# Patient Record
Sex: MALE | NOT HISPANIC OR LATINO | Employment: UNEMPLOYED | URBAN - METROPOLITAN AREA
[De-identification: names, ages, dates, MRNs, and addresses within clinical notes are randomized per-mention and may not be internally consistent; named-entity substitution may affect disease eponyms.]

---
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
## 2023-01-26 ENCOUNTER — APPOINTMENT (OUTPATIENT)
Dept: URBAN - METROPOLITAN AREA CLINIC 256 | Age: 23
Setting detail: DERMATOLOGY
End: 2023-01-29

## 2023-01-26 VITALS — HEIGHT: 72 IN | WEIGHT: 230 LBS

## 2023-01-26 DIAGNOSIS — L63.8 OTHER ALOPECIA AREATA: ICD-10-CM

## 2023-01-26 PROCEDURE — 99204 OFFICE O/P NEW MOD 45 MIN: CPT | Mod: 25

## 2023-01-26 PROCEDURE — OTHER MIPS QUALITY: OTHER

## 2023-01-26 PROCEDURE — OTHER PRESCRIPTION MEDICATION MANAGEMENT: OTHER

## 2023-01-26 PROCEDURE — OTHER COUNSELING: OTHER

## 2023-01-26 PROCEDURE — OTHER INTRALESIONAL KENALOG: OTHER

## 2023-01-26 PROCEDURE — 11900 INJECT SKIN LESIONS </W 7: CPT

## 2023-01-26 PROCEDURE — OTHER PRESCRIPTION: OTHER

## 2023-01-26 RX ORDER — BETAMETHASONE VALERATE 1 MG/G
0.1% CREAM TOPICAL
Qty: 45 | Refills: 1 | Status: ERX | COMMUNITY
Start: 2023-01-26

## 2023-01-26 ASSESSMENT — LOCATION DETAILED DESCRIPTION DERM
LOCATION DETAILED: RIGHT INFERIOR CENTRAL MALAR CHEEK
LOCATION DETAILED: RIGHT INFERIOR LATERAL MALAR CHEEK
LOCATION DETAILED: LEFT INFERIOR CENTRAL MALAR CHEEK
LOCATION DETAILED: LEFT INFERIOR LATERAL MALAR CHEEK

## 2023-01-26 ASSESSMENT — LOCATION SIMPLE DESCRIPTION DERM
LOCATION SIMPLE: LEFT CHEEK
LOCATION SIMPLE: RIGHT CHEEK

## 2023-01-26 ASSESSMENT — SEVERITY OF ALOPECIA TOOL: % SCALP HAIR LOST: 2

## 2023-01-26 ASSESSMENT — LOCATION ZONE DERM: LOCATION ZONE: FACE

## 2023-01-26 NOTE — PROCEDURE: COUNSELING
Detail Level: Detailed
Patient Specific Counseling (Will Not Stick From Patient To Patient): Discussed options including Rogaine, topical steroids, kenalog injections and Olumiant. Recommend continued use of betamethasone valerate, kenalog injections and begin applying Rogaine with Q tip.

## 2023-01-26 NOTE — PROCEDURE: INTRALESIONAL KENALOG
Medical Necessity Clause: This procedure was medically necessary because the lesions that were treated were:\\n\\nLeft superior parietal scalp\\nLesions Injected: 1\\nMedication Injected: 5.0 mg/cc of Kenalog\\nTotal Volume Injected: .02cc\\nLot Number: [id number]\\nExpiration Date: 02/2024\\nNDC #: 7114-2131-20\\nAdministered by: Sunni Hunt Medical Necessity Clause: This procedure was medically necessary because the lesions that were treated were:\\n\\nLeft superior parietal scalp\\nLesions Injected: 1\\nMedication Injected: 5.0 mg/cc of Kenalog\\nTotal Volume Injected: .02cc\\nLot Number: [id number]\\nExpiration Date: 02/2024\\nNDC #: 6343-2703-12\\nAdministered by: Sunni Hunt

## 2023-01-26 NOTE — PROCEDURE: PRESCRIPTION MEDICATION MANAGEMENT
Modify Regimen: Alternate betamethasone valerate every other night with Minoxidil.
Render In Strict Bullet Format?: No
Detail Level: Zone

## 2023-01-26 NOTE — HPI: HAIR LOSS
Previous Labs: No
How Did The Hair Loss Occur?: gradual in onset
How Severe Is Your Hair Loss?: moderate
Additional History: Patient has been using betamethasone valerate cream prescribed by a dermatologist in [facility]. He received steroid injections at his sites of hair loss about 4 weeks ago while in [place].

## 2023-07-05 ENCOUNTER — APPOINTMENT (OUTPATIENT)
Dept: URBAN - METROPOLITAN AREA CLINIC 256 | Age: 23
Setting detail: DERMATOLOGY
End: 2023-07-05

## 2023-07-05 VITALS — WEIGHT: 240 LBS | HEIGHT: 72 IN

## 2023-07-05 DIAGNOSIS — L63.8 OTHER ALOPECIA AREATA: ICD-10-CM

## 2023-07-05 PROCEDURE — 99213 OFFICE O/P EST LOW 20 MIN: CPT | Mod: 25

## 2023-07-05 PROCEDURE — 11900 INJECT SKIN LESIONS </W 7: CPT

## 2023-07-05 PROCEDURE — OTHER INTRALESIONAL KENALOG: OTHER

## 2023-07-05 PROCEDURE — OTHER MIPS QUALITY: OTHER

## 2023-07-05 PROCEDURE — OTHER COUNSELING: OTHER

## 2023-07-05 PROCEDURE — OTHER PHOTO-DOCUMENTATION: OTHER

## 2023-07-05 PROCEDURE — OTHER PATIENT SPECIFIC COUNSELING: OTHER

## 2023-07-05 ASSESSMENT — LOCATION SIMPLE DESCRIPTION DERM
LOCATION SIMPLE: LEFT CHEEK
LOCATION SIMPLE: RIGHT CHEEK

## 2023-07-05 ASSESSMENT — LOCATION ZONE DERM: LOCATION ZONE: FACE

## 2023-07-05 ASSESSMENT — LOCATION DETAILED DESCRIPTION DERM
LOCATION DETAILED: LEFT INFERIOR CENTRAL MALAR CHEEK
LOCATION DETAILED: LEFT SUPERIOR LATERAL BUCCAL CHEEK
LOCATION DETAILED: RIGHT SUPERIOR LATERAL BUCCAL CHEEK

## 2023-07-05 NOTE — PROCEDURE: INTRALESIONAL KENALOG
Ndc# For Kenalog Only: 1740-7568-59 Ndc# For Kenalog Only: 6369-7029-49 Spine appears normal, movement of extremities grossly intact.

## 2023-07-05 NOTE — PROCEDURE: PATIENT SPECIFIC COUNSELING
- advised patient to discontinue use of Betamethasone 0.1% topical cream since we injected the areas today
Detail Level: Zone

## 2023-08-25 ENCOUNTER — APPOINTMENT (OUTPATIENT)
Dept: URBAN - METROPOLITAN AREA CLINIC 256 | Age: 23
Setting detail: DERMATOLOGY
End: 2023-08-26

## 2023-08-25 DIAGNOSIS — L63.8 OTHER ALOPECIA AREATA: ICD-10-CM

## 2023-08-25 PROCEDURE — 11901 INJECT SKIN LESIONS >7: CPT

## 2023-08-25 PROCEDURE — OTHER INTRALESIONAL KENALOG: OTHER

## 2023-08-25 PROCEDURE — OTHER COUNSELING: OTHER

## 2023-08-25 PROCEDURE — OTHER PHOTO-DOCUMENTATION: OTHER

## 2023-08-25 PROCEDURE — OTHER SEPARATE AND IDENTIFIABLE DOCUMENTATION: OTHER

## 2023-08-25 PROCEDURE — 99212 OFFICE O/P EST SF 10 MIN: CPT | Mod: 25

## 2023-08-25 PROCEDURE — OTHER MIPS QUALITY: OTHER

## 2023-08-25 ASSESSMENT — LOCATION DETAILED DESCRIPTION DERM
LOCATION DETAILED: LEFT SUPERIOR PREAURICULAR CHEEK
LOCATION DETAILED: LEFT SUPERIOR LATERAL MALAR CHEEK
LOCATION DETAILED: RIGHT SUPERIOR LATERAL MALAR CHEEK
LOCATION DETAILED: LEFT SUPERIOR LATERAL BUCCAL CHEEK
LOCATION DETAILED: LEFT INFERIOR LATERAL MALAR CHEEK
LOCATION DETAILED: LEFT LATERAL MALAR CHEEK
LOCATION DETAILED: RIGHT SUPERIOR LATERAL BUCCAL CHEEK
LOCATION DETAILED: RIGHT LATERAL MALAR CHEEK

## 2023-08-25 ASSESSMENT — LOCATION SIMPLE DESCRIPTION DERM
LOCATION SIMPLE: RIGHT CHEEK
LOCATION SIMPLE: LEFT CHEEK

## 2023-08-25 ASSESSMENT — LOCATION ZONE DERM: LOCATION ZONE: FACE

## 2023-09-28 ENCOUNTER — APPOINTMENT (OUTPATIENT)
Dept: URBAN - METROPOLITAN AREA CLINIC 256 | Age: 23
Setting detail: DERMATOLOGY
End: 2023-09-28

## 2023-09-28 VITALS — HEIGHT: 72 IN | WEIGHT: 237 LBS

## 2023-09-28 DIAGNOSIS — L63.8 OTHER ALOPECIA AREATA: ICD-10-CM

## 2023-09-28 PROCEDURE — OTHER SEPARATE AND IDENTIFIABLE DOCUMENTATION: OTHER

## 2023-09-28 PROCEDURE — OTHER COUNSELING: OTHER

## 2023-09-28 PROCEDURE — OTHER MIPS QUALITY: OTHER

## 2023-09-28 PROCEDURE — 99212 OFFICE O/P EST SF 10 MIN: CPT | Mod: 25

## 2023-09-28 PROCEDURE — OTHER PHOTO-DOCUMENTATION: OTHER

## 2023-09-28 PROCEDURE — OTHER INTRALESIONAL KENALOG: OTHER

## 2023-09-28 PROCEDURE — 11900 INJECT SKIN LESIONS </W 7: CPT

## 2023-09-28 ASSESSMENT — LOCATION DETAILED DESCRIPTION DERM
LOCATION DETAILED: RIGHT INFERIOR LATERAL MALAR CHEEK
LOCATION DETAILED: RIGHT CENTRAL BUCCAL CHEEK

## 2023-09-28 ASSESSMENT — SEVERITY ASSESSMENT OVERALL AMONG ALL PATIENTS
IN YOUR EXPERIENCE, AMONG ALL PATIENTS YOU HAVE SEEN WITH THIS CONDITION, HOW SEVERE IS THIS PATIENT'S CONDITION?: S2 (25-49% HAIR LOSS)

## 2023-09-28 ASSESSMENT — LOCATION ZONE DERM: LOCATION ZONE: FACE

## 2023-09-28 ASSESSMENT — SEVERITY OF ALOPECIA TOOL: % SCALP HAIR LOST: 30

## 2023-09-28 ASSESSMENT — LOCATION SIMPLE DESCRIPTION DERM: LOCATION SIMPLE: RIGHT CHEEK

## 2023-12-15 ENCOUNTER — VIRTUAL VISIT (OUTPATIENT)
Dept: FAMILY MEDICINE | Facility: CLINIC | Age: 23
End: 2023-12-15
Payer: COMMERCIAL

## 2023-12-15 DIAGNOSIS — F33.1 MODERATE EPISODE OF RECURRENT MAJOR DEPRESSIVE DISORDER (H): Primary | ICD-10-CM

## 2023-12-15 PROCEDURE — 99203 OFFICE O/P NEW LOW 30 MIN: CPT | Mod: VID | Performed by: NURSE PRACTITIONER

## 2023-12-15 RX ORDER — BUPROPION HYDROCHLORIDE 300 MG/1
300 TABLET ORAL EVERY MORNING
Qty: 90 TABLET | Refills: 3 | Status: SHIPPED | OUTPATIENT
Start: 2023-12-15 | End: 2024-06-17

## 2023-12-15 ASSESSMENT — PATIENT HEALTH QUESTIONNAIRE - PHQ9: SUM OF ALL RESPONSES TO PHQ QUESTIONS 1-9: 15

## 2023-12-15 NOTE — PROGRESS NOTES
Michael is a 23 year old who is being evaluated via a billable video visit.      How would you like to obtain your AVS? MyChart  If the video visit is dropped, the invitation should be resent by: Text to cell phone: [phone]  Will anyone else be joining your video visit? No          Assessment & Plan     Moderate episode of recurrent major depressive disorder (H)  Patient did not want to make any changes to treatment, he just wanted to discuss options and then he will follow up fi after he spends time on his vacation if he is still having worsening symptoms    - buPROPion (WELLBUTRIN XL) 300 MG 24 hr tablet  Dispense: 90 tablet; Refill: 3    Kinjal Tovar NP  [facility]   Michael is a 23 year old, presenting for the following health issues:  Recheck Medication (Depression medications)      12/15/2023     2:12 PM   Additional Questions   Roomed by DAWN Agustin   Accompanied by Self       History of Present Illness       Reason for visit:  Medication    He eats 0-1 servings of fruits and vegetables daily.He consumes 0 sweetened beverage(s) daily.He exercises with enough effort to increase his heart rate 30 to 60 minutes per day.  He exercises with enough effort to increase his heart rate 5 days per week.   He is taking medications regularly.       He would like to discuss depression, he started taking from [employer] online provider website, he was taking wellbutrin and he was taking this in 2021 for about 6 months, lately he is feeling down, he started taking the medication again about 2 weeks ago, he started with 300 mg of wellbutrin, he knows that it can take up to 2 months for the medication, he is traveling in about 1.5 weeks and [place] is going [place] and will be there for a month and a half.    PATIENT HEALTH QUESTIONNAIRE-9 (PHQ - 9)    Over the last 2 weeks, how often have you been bothered by any of the following problems?    1. Little interest or pleasure in doing  things -  More than half the days   2. Feeling down, depressed, or hopeless -  More than half the days   3. Trouble falling or staying asleep, or sleeping too much - Nearly every day   4. Feeling tired or having little energy -  Nearly every day   5. Poor appetite or overeating -  More than half the days   6. Feeling bad about yourself - or that you are a failure or have let yourself or your family down -  More than half the days   7. Trouble concentrating on things, such as reading the newspaper or watching television - Several days   8. Moving or speaking so slowly that other people could have noticed? Or the opposite - being so fidgety or restless that you have been moving around a lot more than usual Not at all   9. Thoughts that you would be better off dead or of hurting  yourself in some way Not at all   Total Score: 15     If you checked off any problems, how difficult have these problems made it for you to do your work, take care of things at home, or get along with other people? Somewhat difficult    Developed by Ebony Murcia, Melonie Still, José Miguel Trujillo and colleagues, with an educational cal from [employer] No permission required to reproduce, translate, display or distribute. permission required to reproduce, translate, display or distribute.      Review of Systems   Constitutional, HEENT, cardiovascular, pulmonary, gi and gu systems are negative, except as otherwise noted.      Objective    Vitals - Patient Reported  Weight (Patient Reported): 106.6 kg (235 lb)  Height (Patient Reported): 182.9 cm (6')  BMI (Based on Pt Reported Ht/Wt): 31.87  Pain Score: No Pain (0)        Physical Exam   GENERAL: Healthy, alert and no distress  EYES: Eyes grossly normal to inspection.  No discharge or erythema, or obvious scleral/conjunctival abnormalities.  RESP: No audible wheeze, cough, or visible cyanosis.  No visible retractions or increased work of breathing.    SKIN: Visible skin clear. No  significant rash, abnormal pigmentation or lesions.  NEURO: Cranial nerves grossly intact.  Mentation and speech appropriate for age.  PSYCH: Mentation appears normal, affect normal/bright, judgement and insight intact, normal speech and appearance well-groomed.                Video-Visit Details    Type of service:  Video Visit     Originating Location (pt. Location): Home    Distant Location (provider location):  On-site  Platform used for Video Visit: [employer]      Answers submitted by the patient for this visit:  General Questionnaire (Submitted on 12/15/2023)  Chief Complaint: Chronic problems general questions HPI Form  What is the reason for your visit today? : Medication  How many servings of fruits and vegetables do you eat daily?: 0-1  On average, how many sweetened beverages do you drink each day (Examples: soda, juice, sweet tea, etc.  Do NOT count diet or artificially sweetened beverages)?: 0  How many minutes a day do you exercise enough to make your heart beat faster?: 30 to 60  How many days a week do you exercise enough to make your heart beat faster?: 5  How many days per week do you miss taking your medication?: 0

## 2024-03-26 ENCOUNTER — APPOINTMENT (OUTPATIENT)
Dept: URBAN - METROPOLITAN AREA CLINIC 256 | Age: 24
Setting detail: DERMATOLOGY
End: 2024-03-26

## 2024-03-26 DIAGNOSIS — L63.8 OTHER ALOPECIA AREATA: ICD-10-CM

## 2024-03-26 PROCEDURE — OTHER PATIENT SPECIFIC COUNSELING: OTHER

## 2024-03-26 PROCEDURE — 99212 OFFICE O/P EST SF 10 MIN: CPT | Mod: 25

## 2024-03-26 PROCEDURE — OTHER MIPS QUALITY: OTHER

## 2024-03-26 PROCEDURE — OTHER INTRALESIONAL KENALOG: OTHER

## 2024-03-26 PROCEDURE — OTHER SEPARATE AND IDENTIFIABLE DOCUMENTATION: OTHER

## 2024-03-26 PROCEDURE — OTHER COUNSELING: OTHER

## 2024-03-26 PROCEDURE — 11900 INJECT SKIN LESIONS </W 7: CPT

## 2024-03-26 ASSESSMENT — LOCATION SIMPLE DESCRIPTION DERM
LOCATION SIMPLE: RIGHT TEMPLE
LOCATION SIMPLE: LEFT TEMPLE
LOCATION SIMPLE: RIGHT CHEEK
LOCATION SIMPLE: LEFT CHEEK
LOCATION SIMPLE: SCALP

## 2024-03-26 ASSESSMENT — LOCATION DETAILED DESCRIPTION DERM
LOCATION DETAILED: RIGHT INFERIOR FRONTAL SCALP
LOCATION DETAILED: RIGHT LATERAL TEMPLE
LOCATION DETAILED: RIGHT SUPERIOR LATERAL BUCCAL CHEEK
LOCATION DETAILED: LEFT LATERAL TEMPLE
LOCATION DETAILED: LEFT INFERIOR LATERAL MALAR CHEEK

## 2024-03-26 ASSESSMENT — LOCATION ZONE DERM
LOCATION ZONE: SCALP
LOCATION ZONE: FACE

## 2024-03-26 NOTE — PROCEDURE: PATIENT SPECIFIC COUNSELING
-He reports his insurance will no longer cover visits at [place].\\n-Discussed MATTHEW can be filled out at new dermatology clinic where records could be shared. He reports understanding.
Detail Level: Zone

## 2024-03-26 NOTE — PROCEDURE: INTRALESIONAL KENALOG
How Many Mls Were Removed From The 10 Mg/Ml (5ml) Vial When Preparing The Injectable Solution?: 0
Kenalog Preparation: Kenalog
Treatment Number (Optional): 4
Expiration Date For Kenalog (Optional): APR 2024
Include Z78.9 (Other Specified Conditions Influencing Health Status) As An Associated Diagnosis?: No
Concentration Of Kenalog Solution Injected (Mg/Ml): 10.0
Consent: The risks of atrophy were reviewed with the patient.
Detail Level: Detailed
Ndc# For Kenalog Only: 8521-5194-59
Show Inventory Tab: Hide
Total Volume (Ccs): 0.5
Kenalog Type Of Vial: Multiple Dose
Validate Note Data When Using Inventory: Yes
Medical Necessity Clause: This procedure was medically necessary because the lesions that were treated were:
Lot # For Kenalog (Optional): [id number]

## 2024-04-30 ENCOUNTER — APPOINTMENT (OUTPATIENT)
Dept: URBAN - METROPOLITAN AREA CLINIC 256 | Age: 24
Setting detail: DERMATOLOGY
End: 2024-04-30

## 2024-04-30 VITALS — WEIGHT: 230 LBS | HEIGHT: 72 IN

## 2024-04-30 DIAGNOSIS — L63.8 OTHER ALOPECIA AREATA: ICD-10-CM

## 2024-04-30 PROCEDURE — OTHER COUNSELING: OTHER

## 2024-04-30 PROCEDURE — OTHER PATIENT SPECIFIC COUNSELING: OTHER

## 2024-04-30 PROCEDURE — OTHER INTRALESIONAL KENALOG: OTHER

## 2024-04-30 PROCEDURE — OTHER MIPS QUALITY: OTHER

## 2024-04-30 PROCEDURE — 11900 INJECT SKIN LESIONS </W 7: CPT

## 2024-04-30 PROCEDURE — OTHER PHOTO-DOCUMENTATION: OTHER

## 2024-04-30 ASSESSMENT — LOCATION ZONE DERM
LOCATION ZONE: FACE
LOCATION ZONE: SCALP

## 2024-04-30 ASSESSMENT — LOCATION SIMPLE DESCRIPTION DERM
LOCATION SIMPLE: LEFT CHEEK
LOCATION SIMPLE: RIGHT CHEEK
LOCATION SIMPLE: SCALP

## 2024-04-30 ASSESSMENT — LOCATION DETAILED DESCRIPTION DERM
LOCATION DETAILED: LEFT CENTRAL BUCCAL CHEEK
LOCATION DETAILED: RIGHT CENTRAL BUCCAL CHEEK
LOCATION DETAILED: RIGHT INFERIOR CENTRAL MALAR CHEEK
LOCATION DETAILED: RIGHT CENTRAL FRONTAL SCALP
LOCATION DETAILED: RIGHT SUPERIOR CENTRAL BUCCAL CHEEK

## 2024-04-30 ASSESSMENT — SEVERITY OF ALOPECIA TOOL: % SCALP HAIR LOST: 5

## 2024-04-30 NOTE — PROCEDURE: PATIENT SPECIFIC COUNSELING
-Reassured the patient of improvement \\n-Recommended another round of ILK to affected areas. Risks and benefits reviewed and all questions answered\\n-The patient was agreeable.\\n-Informed the patient that he could RTC in 4-5 weeks for repeat ILK if symptoms do not improve. He notes changing insurance and having his next follow up with a different dermatology group.
Detail Level: Zone

## 2024-04-30 NOTE — PROCEDURE: INTRALESIONAL KENALOG
How Many Mls Were Removed From The 10 Mg/Ml (5ml) Vial When Preparing The Injectable Solution?: 0
Administered By (Optional): Mariana White PA-C
Kenalog Preparation: Kenalog
Treatment Number (Optional): 5
Expiration Date For Kenalog (Optional): APR 2024
Include Z78.9 (Other Specified Conditions Influencing Health Status) As An Associated Diagnosis?: No
Concentration Of Kenalog Solution Injected (Mg/Ml): 10.0
Consent: The risks of atrophy were reviewed with the patient.
Detail Level: Detailed
Ndc# For Kenalog Only: 2836-1085-79
Show Inventory Tab: Hide
Total Volume (Ccs): 0.7
Kenalog Type Of Vial: Multiple Dose
Validate Note Data When Using Inventory: Yes
Medical Necessity Clause: This procedure was medically necessary because the lesions that were treated were:
Lot # For Kenalog (Optional): [id number]

## 2024-06-17 ENCOUNTER — OFFICE VISIT (OUTPATIENT)
Dept: INTERNAL MEDICINE | Facility: CLINIC | Age: 24
End: 2024-06-17
Payer: COMMERCIAL

## 2024-06-17 VITALS
SYSTOLIC BLOOD PRESSURE: 120 MMHG | RESPIRATION RATE: 16 BRPM | OXYGEN SATURATION: 97 % | HEART RATE: 74 BPM | DIASTOLIC BLOOD PRESSURE: 70 MMHG | TEMPERATURE: 97.1 F | WEIGHT: 236 LBS

## 2024-06-17 DIAGNOSIS — F33.1 MODERATE EPISODE OF RECURRENT MAJOR DEPRESSIVE DISORDER (H): ICD-10-CM

## 2024-06-17 DIAGNOSIS — Z13.1 SCREENING FOR DIABETES MELLITUS: ICD-10-CM

## 2024-06-17 DIAGNOSIS — Z13.6 CARDIOVASCULAR SCREENING; LDL GOAL LESS THAN 130: ICD-10-CM

## 2024-06-17 DIAGNOSIS — L63.8 OTHER ALOPECIA AREATA: Primary | ICD-10-CM

## 2024-06-17 LAB
ANION GAP SERPL CALCULATED.3IONS-SCNC: 11 MMOL/L (ref 7–15)
BUN SERPL-MCNC: 16.8 MG/DL (ref 6–20)
CALCIUM SERPL-MCNC: 9.8 MG/DL (ref 8.6–10)
CHLORIDE SERPL-SCNC: 104 MMOL/L (ref 98–107)
CHOLEST SERPL-MCNC: 215 MG/DL
CREAT SERPL-MCNC: 0.84 MG/DL (ref 0.67–1.17)
DEPRECATED HCO3 PLAS-SCNC: 23 MMOL/L (ref 22–29)
EGFRCR SERPLBLD CKD-EPI 2021: >90 ML/MIN/1.73M2
ERYTHROCYTE [DISTWIDTH] IN BLOOD BY AUTOMATED COUNT: 12.3 % (ref 10–15)
FASTING STATUS PATIENT QL REPORTED: YES
FASTING STATUS PATIENT QL REPORTED: YES
FERRITIN SERPL-MCNC: 240 NG/ML (ref 31–409)
GLUCOSE SERPL-MCNC: 97 MG/DL (ref 70–99)
HCT VFR BLD AUTO: 47 % (ref 40–53)
HDLC SERPL-MCNC: 52 MG/DL
HGB BLD-MCNC: 16.3 G/DL (ref 13.3–17.7)
IRON BINDING CAPACITY (ROCHE): 325 UG/DL (ref 240–430)
IRON SATN MFR SERPL: 38 % (ref 15–46)
IRON SERPL-MCNC: 124 UG/DL (ref 61–157)
LDLC SERPL CALC-MCNC: 147 MG/DL
MCH RBC QN AUTO: 28.7 PG (ref 26.5–33)
MCHC RBC AUTO-ENTMCNC: 34.7 G/DL (ref 31.5–36.5)
MCV RBC AUTO: 83 FL (ref 78–100)
NONHDLC SERPL-MCNC: 163 MG/DL
PLATELET # BLD AUTO: 301 10E3/UL (ref 150–450)
POTASSIUM SERPL-SCNC: 4.1 MMOL/L (ref 3.4–5.3)
RBC # BLD AUTO: 5.67 10E6/UL (ref 4.4–5.9)
SODIUM SERPL-SCNC: 138 MMOL/L (ref 135–145)
TRIGL SERPL-MCNC: 80 MG/DL
TSH SERPL DL<=0.005 MIU/L-ACNC: 1.22 UIU/ML (ref 0.3–4.2)
WBC # BLD AUTO: 7.4 10E3/UL (ref 4–11)

## 2024-06-17 PROCEDURE — 80048 BASIC METABOLIC PNL TOTAL CA: CPT | Performed by: INTERNAL MEDICINE

## 2024-06-17 PROCEDURE — 82728 ASSAY OF FERRITIN: CPT | Performed by: INTERNAL MEDICINE

## 2024-06-17 PROCEDURE — 83540 ASSAY OF IRON: CPT | Performed by: INTERNAL MEDICINE

## 2024-06-17 PROCEDURE — 36415 COLL VENOUS BLD VENIPUNCTURE: CPT | Performed by: INTERNAL MEDICINE

## 2024-06-17 PROCEDURE — 99203 OFFICE O/P NEW LOW 30 MIN: CPT | Performed by: INTERNAL MEDICINE

## 2024-06-17 PROCEDURE — 80061 LIPID PANEL: CPT | Performed by: INTERNAL MEDICINE

## 2024-06-17 PROCEDURE — 83550 IRON BINDING TEST: CPT | Performed by: INTERNAL MEDICINE

## 2024-06-17 PROCEDURE — 84443 ASSAY THYROID STIM HORMONE: CPT | Performed by: INTERNAL MEDICINE

## 2024-06-17 PROCEDURE — 85027 COMPLETE CBC AUTOMATED: CPT | Performed by: INTERNAL MEDICINE

## 2024-06-17 ASSESSMENT — PATIENT HEALTH QUESTIONNAIRE - PHQ9
10. IF YOU CHECKED OFF ANY PROBLEMS, HOW DIFFICULT HAVE THESE PROBLEMS MADE IT FOR YOU TO DO YOUR WORK, TAKE CARE OF THINGS AT HOME, OR GET ALONG WITH OTHER PEOPLE: SOMEWHAT DIFFICULT
SUM OF ALL RESPONSES TO PHQ QUESTIONS 1-9: 4
SUM OF ALL RESPONSES TO PHQ QUESTIONS 1-9: 4

## 2024-06-17 NOTE — PROGRESS NOTES
Assessment & Plan     Other alopecia areata  Will check labs as ordered, counseled that we should prepare for the fact that all of these results could very well be normal.  Still worth checking.  - TSH with free T4 reflex; Future  - CBC with platelets; Future  - Ferritin; Future  - Iron and iron binding capacity; Future    CARDIOVASCULAR SCREENING; LDL GOAL LESS THAN 130    - Lipid panel reflex to direct LDL Fasting; Future    Screening for diabetes mellitus    - Basic metabolic panel  (Ca, Cl, CO2, Creat, Gluc, K, Na, BUN); Future    Moderate episode of recurrent major depressive disorder (H)  Not currently treated, previously on Wellbutrin.  Clinical course is stable.                Vonnie Rodriguez is a 23 year old, presenting for the following health issues:  Thyroid Problem    History of Present Illness       Hypothyroidism:     Since last visit, patient describes the following symptoms::  Hair loss    He eats 0-1 servings of fruits and vegetables daily.He consumes 0 sweetened beverage(s) daily.He exercises with enough effort to increase his heart rate 60 or more minutes per day.  He exercises with enough effort to increase his heart rate 4 days per week.   He is taking medications regularly.       Patient is new to our clinic today.  He has [facility] for treatment of what has been called alopecia areata.  He presented with a fairly symmetric bald spot/ring on his right face/beard.  Has been treated with intradermal steroid injections and topical steroids.  His dermatologist has recommended that he have thyroid and iron levels checked.          Review of Systems  Constitutional, HEENT, cardiovascular, pulmonary, gi and gu systems are negative, except as otherwise noted.      Objective    /70 (BP Location: Left arm, Patient Position: Sitting, Cuff Size: Adult Regular)   Pulse 74   Temp 97.1  F (36.2  C) (Temporal)   Resp 16   Wt 107 kg (236 lb)    SpO2 97%   There is no height or weight on file to calculate BMI.  Physical Exam   GENERAL: alert and no distress  NECK: no adenopathy, no asymmetry, masses, or scars  RESP: lungs clear to auscultation - no rales, rhonchi or wheezes  CV: regular rate and rhythm, normal S1 S2, no S3 or S4, no murmur, click or rub, no peripheral edema  ABDOMEN: soft, nontender, no hepatosplenomegaly, no masses and bowel sounds normal  MS: no gross musculoskeletal defects noted, no edema            Signed Electronically by: Rocco Pelaez MD

## 2024-07-07 ENCOUNTER — HEALTH MAINTENANCE LETTER (OUTPATIENT)
Age: 24
End: 2024-07-07

## 2024-12-13 ENCOUNTER — APPOINTMENT (OUTPATIENT)
Dept: GENERAL RADIOLOGY | Facility: CLINIC | Age: 24
DRG: 282 | End: 2024-12-13
Attending: PHYSICIAN ASSISTANT

## 2024-12-13 ENCOUNTER — APPOINTMENT (OUTPATIENT)
Dept: CARDIOLOGY | Facility: CLINIC | Age: 24
DRG: 282 | End: 2024-12-13
Attending: PHYSICIAN ASSISTANT

## 2024-12-13 ENCOUNTER — HOSPITAL ENCOUNTER (INPATIENT)
Facility: CLINIC | Age: 24
LOS: 2 days | Discharge: HOME | DRG: 282 | End: 2024-12-15
Attending: PHYSICIAN ASSISTANT | Admitting: HOSPITALIST

## 2024-12-13 DIAGNOSIS — R07.9 CHEST PAIN: ICD-10-CM

## 2024-12-13 DIAGNOSIS — I40.1 ACUTE IDIOPATHIC MYOCARDITIS: Primary | ICD-10-CM

## 2024-12-13 DIAGNOSIS — I51.4 MYOCARDITIS (H): ICD-10-CM

## 2024-12-13 DIAGNOSIS — R79.89 ELEVATED TROPONIN: ICD-10-CM

## 2024-12-13 DIAGNOSIS — R07.2 PRECORDIAL CHEST PAIN: ICD-10-CM

## 2024-12-13 LAB
ANION GAP SERPL CALCULATED.3IONS-SCNC: 13 MMOL/L (ref 7–15)
ATRIAL RATE - MUSE: 68 BPM
BASOPHILS # BLD AUTO: 0 10E3/UL (ref 0–0.2)
BASOPHILS NFR BLD AUTO: 0 %
BUN SERPL-MCNC: 11.2 MG/DL (ref 6–20)
CALCIUM SERPL-MCNC: 9 MG/DL (ref 8.8–10.4)
CHLORIDE SERPL-SCNC: 101 MMOL/L (ref 98–107)
CREAT SERPL-MCNC: 0.83 MG/DL (ref 0.67–1.17)
CRP SERPL-MCNC: 51.55 MG/L
D DIMER PPP FEU-MCNC: <0.27 UG/ML FEU (ref 0–0.5)
DIASTOLIC BLOOD PRESSURE - MUSE: NORMAL MMHG
EGFRCR SERPLBLD CKD-EPI 2021: >90 ML/MIN/1.73M2
EOSINOPHIL # BLD AUTO: 0.1 10E3/UL (ref 0–0.7)
EOSINOPHIL NFR BLD AUTO: 1 %
ERYTHROCYTE [DISTWIDTH] IN BLOOD BY AUTOMATED COUNT: 12.2 % (ref 10–15)
FLUAV RNA SPEC QL NAA+PROBE: NEGATIVE
FLUBV RNA RESP QL NAA+PROBE: NEGATIVE
GLUCOSE SERPL-MCNC: 93 MG/DL (ref 70–99)
HCO3 SERPL-SCNC: 23 MMOL/L (ref 22–29)
HCT VFR BLD AUTO: 43.9 % (ref 40–53)
HGB BLD-MCNC: 15 G/DL (ref 13.3–17.7)
HOLD SPECIMEN: NORMAL
IMM GRANULOCYTES # BLD: 0.1 10E3/UL
IMM GRANULOCYTES NFR BLD: 1 %
INTERPRETATION ECG - MUSE: NORMAL
LVEF ECHO: NORMAL
LYMPHOCYTES # BLD AUTO: 1.7 10E3/UL (ref 0.8–5.3)
LYMPHOCYTES NFR BLD AUTO: 17 %
MCH RBC QN AUTO: 28.9 PG (ref 26.5–33)
MCHC RBC AUTO-ENTMCNC: 34.2 G/DL (ref 31.5–36.5)
MCV RBC AUTO: 85 FL (ref 78–100)
MONOCYTES # BLD AUTO: 0.8 10E3/UL (ref 0–1.3)
MONOCYTES NFR BLD AUTO: 8 %
NEUTROPHILS # BLD AUTO: 7.3 10E3/UL (ref 1.6–8.3)
NEUTROPHILS NFR BLD AUTO: 73 %
NRBC # BLD AUTO: 0 10E3/UL
NRBC BLD AUTO-RTO: 0 /100
P AXIS - MUSE: 28 DEGREES
PLATELET # BLD AUTO: 289 10E3/UL (ref 150–450)
POTASSIUM SERPL-SCNC: 4.3 MMOL/L (ref 3.4–5.3)
PR INTERVAL - MUSE: 136 MS
PROCALCITONIN SERPL IA-MCNC: 0.06 NG/ML
QRS DURATION - MUSE: 94 MS
QT - MUSE: 388 MS
QTC - MUSE: 412 MS
R AXIS - MUSE: 37 DEGREES
RBC # BLD AUTO: 5.19 10E6/UL (ref 4.4–5.9)
RSV RNA SPEC NAA+PROBE: NEGATIVE
SARS-COV-2 RNA RESP QL NAA+PROBE: NEGATIVE
SODIUM SERPL-SCNC: 137 MMOL/L (ref 135–145)
SYSTOLIC BLOOD PRESSURE - MUSE: NORMAL MMHG
T AXIS - MUSE: 26 DEGREES
TROPONIN T SERPL HS-MCNC: 1010 NG/L
TROPONIN T SERPL HS-MCNC: 1180 NG/L
TROPONIN T SERPL HS-MCNC: 1465 NG/L
VENTRICULAR RATE- MUSE: 68 BPM
WBC # BLD AUTO: 9.9 10E3/UL (ref 4–11)

## 2024-12-13 PROCEDURE — 71046 X-RAY EXAM CHEST 2 VIEWS: CPT

## 2024-12-13 PROCEDURE — 99223 1ST HOSP IP/OBS HIGH 75: CPT | Performed by: HOSPITALIST

## 2024-12-13 PROCEDURE — 84484 ASSAY OF TROPONIN QUANT: CPT | Performed by: PHYSICIAN ASSISTANT

## 2024-12-13 PROCEDURE — 99285 EMERGENCY DEPT VISIT HI MDM: CPT | Mod: 25

## 2024-12-13 PROCEDURE — 85025 COMPLETE CBC W/AUTO DIFF WBC: CPT | Performed by: PHYSICIAN ASSISTANT

## 2024-12-13 PROCEDURE — 84484 ASSAY OF TROPONIN QUANT: CPT | Performed by: HOSPITALIST

## 2024-12-13 PROCEDURE — 93306 TTE W/DOPPLER COMPLETE: CPT | Mod: 26 | Performed by: INTERNAL MEDICINE

## 2024-12-13 PROCEDURE — 36415 COLL VENOUS BLD VENIPUNCTURE: CPT | Performed by: PHYSICIAN ASSISTANT

## 2024-12-13 PROCEDURE — 84145 PROCALCITONIN (PCT): CPT | Performed by: PHYSICIAN ASSISTANT

## 2024-12-13 PROCEDURE — 86140 C-REACTIVE PROTEIN: CPT | Performed by: HOSPITALIST

## 2024-12-13 PROCEDURE — 85379 FIBRIN DEGRADATION QUANT: CPT | Performed by: PHYSICIAN ASSISTANT

## 2024-12-13 PROCEDURE — 120N000001 HC R&B MED SURG/OB

## 2024-12-13 PROCEDURE — 36415 COLL VENOUS BLD VENIPUNCTURE: CPT | Performed by: HOSPITALIST

## 2024-12-13 PROCEDURE — 255N000002 HC RX 255 OP 636: Performed by: PHYSICIAN ASSISTANT

## 2024-12-13 PROCEDURE — 87040 BLOOD CULTURE FOR BACTERIA: CPT | Performed by: PHYSICIAN ASSISTANT

## 2024-12-13 PROCEDURE — 87637 SARSCOV2&INF A&B&RSV AMP PRB: CPT | Performed by: PHYSICIAN ASSISTANT

## 2024-12-13 PROCEDURE — 999N000208 ECHOCARDIOGRAM COMPLETE

## 2024-12-13 PROCEDURE — 80048 BASIC METABOLIC PNL TOTAL CA: CPT | Performed by: PHYSICIAN ASSISTANT

## 2024-12-13 RX ORDER — ONDANSETRON 2 MG/ML
4 INJECTION INTRAMUSCULAR; INTRAVENOUS EVERY 6 HOURS PRN
Status: DISCONTINUED | OUTPATIENT
Start: 2024-12-13 | End: 2024-12-15 | Stop reason: HOSPADM

## 2024-12-13 RX ORDER — ACETAMINOPHEN 650 MG/1
650 SUPPOSITORY RECTAL EVERY 4 HOURS PRN
Status: DISCONTINUED | OUTPATIENT
Start: 2024-12-13 | End: 2024-12-15 | Stop reason: HOSPADM

## 2024-12-13 RX ORDER — CLINDAMYCIN HYDROCHLORIDE 300 MG/1
300 CAPSULE ORAL 4 TIMES DAILY
Status: ON HOLD | COMMUNITY
Start: 2024-12-10 | End: 2024-12-15

## 2024-12-13 RX ORDER — ASPIRIN 81 MG/1
324 TABLET, CHEWABLE ORAL ONCE
Status: COMPLETED | OUTPATIENT
Start: 2024-12-13 | End: 2024-12-13

## 2024-12-13 RX ORDER — AMOXICILLIN 250 MG
2 CAPSULE ORAL 2 TIMES DAILY PRN
Status: DISCONTINUED | OUTPATIENT
Start: 2024-12-13 | End: 2024-12-15 | Stop reason: HOSPADM

## 2024-12-13 RX ORDER — CALCIUM CARBONATE 500 MG/1
1000 TABLET, CHEWABLE ORAL 4 TIMES DAILY PRN
Status: DISCONTINUED | OUTPATIENT
Start: 2024-12-13 | End: 2024-12-15 | Stop reason: HOSPADM

## 2024-12-13 RX ORDER — LIDOCAINE 40 MG/G
CREAM TOPICAL
Status: DISCONTINUED | OUTPATIENT
Start: 2024-12-13 | End: 2024-12-15 | Stop reason: HOSPADM

## 2024-12-13 RX ORDER — AMOXICILLIN 250 MG
1 CAPSULE ORAL 2 TIMES DAILY PRN
Status: DISCONTINUED | OUTPATIENT
Start: 2024-12-13 | End: 2024-12-15 | Stop reason: HOSPADM

## 2024-12-13 RX ORDER — ACETAMINOPHEN 325 MG/1
650 TABLET ORAL EVERY 4 HOURS PRN
Status: DISCONTINUED | OUTPATIENT
Start: 2024-12-13 | End: 2024-12-15 | Stop reason: HOSPADM

## 2024-12-13 RX ORDER — ONDANSETRON 4 MG/1
4 TABLET, ORALLY DISINTEGRATING ORAL EVERY 6 HOURS PRN
Status: DISCONTINUED | OUTPATIENT
Start: 2024-12-13 | End: 2024-12-15 | Stop reason: HOSPADM

## 2024-12-13 RX ADMIN — HUMAN ALBUMIN MICROSPHERES AND PERFLUTREN 5 ML: 10; .22 INJECTION, SOLUTION INTRAVENOUS at 19:12

## 2024-12-13 ASSESSMENT — ACTIVITIES OF DAILY LIVING (ADL)
WEAR_GLASSES_OR_BLIND: NO
FALL_HISTORY_WITHIN_LAST_SIX_MONTHS: NO
ADLS_ACUITY_SCORE: 15
CHANGE_IN_FUNCTIONAL_STATUS_SINCE_ONSET_OF_CURRENT_ILLNESS/INJURY: NO
ADLS_ACUITY_SCORE: 41
HEARING_DIFFICULTY_OR_DEAF: NO
ADLS_ACUITY_SCORE: 15
ADLS_ACUITY_SCORE: 41
CONCENTRATING,_REMEMBERING_OR_MAKING_DECISIONS_DIFFICULTY: NO
ADLS_ACUITY_SCORE: 41
TOILETING_ISSUES: NO
DIFFICULTY_EATING/SWALLOWING: NO
ADLS_ACUITY_SCORE: 15
DOING_ERRANDS_INDEPENDENTLY_DIFFICULTY: NO
DIFFICULTY_COMMUNICATING: NO
ADLS_ACUITY_SCORE: 41
WALKING_OR_CLIMBING_STAIRS_DIFFICULTY: NO
ADLS_ACUITY_SCORE: 15
DRESSING/BATHING_DIFFICULTY: NO

## 2024-12-13 ASSESSMENT — COLUMBIA-SUICIDE SEVERITY RATING SCALE - C-SSRS
1. IN THE PAST MONTH, HAVE YOU WISHED YOU WERE DEAD OR WISHED YOU COULD GO TO SLEEP AND NOT WAKE UP?: NO
6. HAVE YOU EVER DONE ANYTHING, STARTED TO DO ANYTHING, OR PREPARED TO DO ANYTHING TO END YOUR LIFE?: NO
2. HAVE YOU ACTUALLY HAD ANY THOUGHTS OF KILLING YOURSELF IN THE PAST MONTH?: NO

## 2024-12-13 NOTE — ED NOTES
Bed: ED10  Expected date: 12/13/24  Expected time: 5:11 PM  Means of arrival:   Comments:  Clean- RP4

## 2024-12-13 NOTE — H&P
[facility]    History and Physical  Hospitalist       Date of Admission:  12/13/2024    Assessment & Plan   Michael Roman is a 24 year old male without a PMH who presents with chest pain.    #NSTEMI secondary to viral myocarditis: Patient's symptoms started as a sore throat on Monday, 12/9.  He then had a fever to 104 on Tuesday, 12/10.  He had some shortness of breath at that time.  He also endorsed some lightheadedness/dizziness and headache.  He was seen in urgent care and had a rapid strep test that was negative.  He was empirically placed on clindamycin while awaiting the strep test.  With his strep test being negative they then tested him for mono yesterday which was also negative.  Today at approximately 1030 he woke up with chest discomfort.  Notes that it lasted about 3 hours and then went away.  He did take an aspirin 325 prior to coming to the ER.  He denies current chest pain.  Denies any orthopnea or PND.  Denies any positional nature of the chest pain.  No leg swelling or leg pain.  Denies any current shortness of breath.  No cardiac history.  No family history of cardiac disease.  He is not taking any prescription medications chronically.  -ED, patient afebrile, nontachycardic and normotensive.  EKG with sinus rhythm without any clear ST segment elevation or depression.  High-sensitivity troponin was elevated at 1010.  His CBC is without leukocytosis.  BMP unremarkable.  Procalcitonin negative.  Chest x-ray without any acute CP process.  Cardiology was contacted and recommended not starting heparin drip given likely myocarditis.  Echocardiogram was ordered in the ED and to be done prior to being sent to the floor.  -Follow up TTE to ensure to heart failure as result of myocarditis.  Fortunately he is non-tachy and normotensive without subjective SOB.  CXR is clear.    -Cardiology consulted.  Monitor on telemetry.  Trend troponin to peak.  Given his preceding viral  syndrome, normal EKG with no risk factors for coronary artery disease, cardiology (Dr. Dupont) recommends against heparin drip at this point.  -Follow up COVID-19  -Check CRP and trend tomorrow AM.     Addendum: High-sensitivity troponin mildly up.  TTE done with normal EF with possible mild inferolateral wall motion abnormality.  I did talk to on-call cardiology this evening.  Agree with not heparinizing given myocarditis.  Recommended repeat EKG in the a.m. to evaluate for possible pericarditis.  If clinical situation were to change then would get EKG sooner and would repaged cardiology.      DVT Prophylaxis: Pneumatic Compression Devices  Code Status: Full Code  Medically Ready for Discharge: Anticipated in 2-4 Days    Aleks Cifuentes MD    Primary Care Physician   Physician No Ref-Primary    Chief Complaint   Chest pain    History is obtained from the patient, patient's chart and discussed with ER physician.    History of Present Illness   Michael Roman is a 24 year old male without a PMH who presents with chest pain.    Patient's symptoms started as a sore throat on Monday, 12/9.  He then had a fever to 104 on Tuesday, 12/10.  He had some shortness of breath at that time.  He also endorsed some lightheadedness/dizziness and headache.  He was seen in urgent care and had a rapid strep test that was negative.  He was empirically placed on clindamycin while awaiting the strep test.  With his strep test being negative they then tested him for mono yesterday which was also negative.  Today at approximately 1030 he woke up with chest discomfort.  Notes that it lasted about 3 hours and then went away.  He did take an aspirin 325 prior to coming to the ER.  He denies current chest pain.  Denies any orthopnea or PND.  Denies any positional nature of the chest pain.  No leg swelling or leg pain.  Denies any current shortness of breath.  No cardiac history.  No family history of cardiac disease.  He is not taking any  prescription medications chronically.    In the ED, patient afebrile, nontachycardic and normotensive.  EKG with sinus rhythm without any clear ST segment elevation or depression.  High-sensitivity troponin was elevated at 1010.  His CBC is without leukocytosis.  BMP unremarkable.  Procalcitonin negative.  Chest x-ray without any acute CP process.  Cardiology was contacted and recommended not starting heparin drip given likely myocarditis.  Echocardiogram was ordered in the ED and to be done prior to being sent to the floor.    Past Medical History    I have reviewed this patient's medical history and updated it with pertinent information if needed.   No past medical history on file.    Past Surgical History   I have reviewed this patient's surgical history and updated it with pertinent information if needed.  Past Surgical History:   Procedure Laterality Date    ORTHOPEDIC SURGERY Right     ORIF right ankle       Prior to Admission Medications   None     Allergies   No Known Allergies    Social History   I have reviewed this patient's social history and updated it with pertinent information if needed. Michael Roman  reports that he has never smoked. He has never used smokeless tobacco. He reports current alcohol use. He reports that he does not currently use drugs.    Family History   I have reviewed this patient's family history and updated it with pertinent information if needed.   No family history on file.    Review of Systems   The 10 point Review of Systems is negative other than noted in the HPI or here.     Physical Exam   Temp: 97  F (36.1  C) Temp src: Temporal BP: 132/83 Pulse: 73   Resp: 18 SpO2: 98 % O2 Device: None (Room air)    Vital Signs with Ranges  Temp:  [97  F (36.1  C)] 97  F (36.1  C)  Pulse:  [73] 73  Resp:  [18] 18  BP: (132)/(83) 132/83  SpO2:  [98 %] 98 %  242 lbs 8.1 oz    Constitutional: NAD, Nontoxic  HEENT: Normocephalic, MMM, no elevation of JVD noted. No tonsillar swelling  or erythema. Uvula at midline.   Respiratory: Nl WOB, Clear bilaterally, No wheezes, no crackles  Cardiovascular: Regular, no murmur  GI: BS+, NT, ND, no rebound or guarding  Lymph/Hematologic: No bruising. No cervical LAD  Skin: No rash  Musculoskeletal: Nl Tone, No edema noted  Neurologic: A&Ox3, Answers appropriately. CNII-XII intact. Moves all extremities. No tremor  Psychiatric: Calm    Data   Data reviewed today:  I personally reviewed   Recent Labs   Lab 12/13/24  1616   WBC 9.9   HGB 15.0   MCV 85         POTASSIUM 4.3   CHLORIDE 101   CO2 23   BUN 11.2   CR 0.83   ANIONGAP 13   NAMAN 9.0   GLC 93       Recent Results (from the past 24 hours)   Chest XR,  PA & LAT    Narrative    EXAM: XR CHEST 2 VIEWS  LOCATION: [facility]  DATE: 12/13/2024    INDICATION: Chest pain  COMPARISON: None.      Impression    IMPRESSION: Heart size and pulmonary vascularity are normal. No focal consolidation, pleural effusion, or pneumothorax.       Clinically Significant Risk Factors Present on Admission                             # Obesity: Estimated body mass index is 32.89 kg/m  as calculated from the following:    Height as of this encounter: 1.829 m (6').    Weight as of this encounter: 110 kg (242 lb 8.1 oz).

## 2024-12-13 NOTE — ED TRIAGE NOTES
Pt states that he has had a sore throat for several days.  He was tested for strep and this came back negative and he was diagnosed with tonsillitis at .  He states that this morning he had chest tightness that lasted for a couple of hours.  He was seen at  and sent to the ED for a cardiac work up.  Pt denies any chest pain at this time but states it was a tightness this morning.      Triage Assessment (Adult)       Row Name 12/13/24 1504          Triage Assessment    Airway WDL WDL        Respiratory WDL    Respiratory WDL WDL        Cardiac WDL    Cardiac WDL X;chest pain        Chest Pain Assessment    Character tightness

## 2024-12-14 LAB
ANION GAP SERPL CALCULATED.3IONS-SCNC: 13 MMOL/L (ref 7–15)
BUN SERPL-MCNC: 10.5 MG/DL (ref 6–20)
CALCIUM SERPL-MCNC: 9.5 MG/DL (ref 8.8–10.4)
CHLORIDE SERPL-SCNC: 103 MMOL/L (ref 98–107)
CREAT SERPL-MCNC: 0.96 MG/DL (ref 0.67–1.17)
CRP SERPL-MCNC: 51.77 MG/L
EGFRCR SERPLBLD CKD-EPI 2021: >90 ML/MIN/1.73M2
ERYTHROCYTE [DISTWIDTH] IN BLOOD BY AUTOMATED COUNT: 12.2 % (ref 10–15)
GLUCOSE SERPL-MCNC: 94 MG/DL (ref 70–99)
HCO3 SERPL-SCNC: 23 MMOL/L (ref 22–29)
HCT VFR BLD AUTO: 41 % (ref 40–53)
HGB BLD-MCNC: 13.9 G/DL (ref 13.3–17.7)
MCH RBC QN AUTO: 28.3 PG (ref 26.5–33)
MCHC RBC AUTO-ENTMCNC: 33.9 G/DL (ref 31.5–36.5)
MCV RBC AUTO: 84 FL (ref 78–100)
PLATELET # BLD AUTO: 275 10E3/UL (ref 150–450)
POTASSIUM SERPL-SCNC: 4.1 MMOL/L (ref 3.4–5.3)
RBC # BLD AUTO: 4.91 10E6/UL (ref 4.4–5.9)
SODIUM SERPL-SCNC: 139 MMOL/L (ref 135–145)
TROPONIN T SERPL HS-MCNC: 1472 NG/L
TROPONIN T SERPL HS-MCNC: 1666 NG/L
WBC # BLD AUTO: 9.1 10E3/UL (ref 4–11)

## 2024-12-14 PROCEDURE — 93010 ELECTROCARDIOGRAM REPORT: CPT | Performed by: INTERNAL MEDICINE

## 2024-12-14 PROCEDURE — 250N000013 HC RX MED GY IP 250 OP 250 PS 637: Performed by: HOSPITALIST

## 2024-12-14 PROCEDURE — 82374 ASSAY BLOOD CARBON DIOXIDE: CPT | Performed by: HOSPITALIST

## 2024-12-14 PROCEDURE — 120N000001 HC R&B MED SURG/OB

## 2024-12-14 PROCEDURE — 99232 SBSQ HOSP IP/OBS MODERATE 35: CPT | Performed by: HOSPITALIST

## 2024-12-14 PROCEDURE — 36415 COLL VENOUS BLD VENIPUNCTURE: CPT | Performed by: HOSPITALIST

## 2024-12-14 PROCEDURE — 84484 ASSAY OF TROPONIN QUANT: CPT | Performed by: INTERNAL MEDICINE

## 2024-12-14 PROCEDURE — 86140 C-REACTIVE PROTEIN: CPT | Performed by: HOSPITALIST

## 2024-12-14 PROCEDURE — 85027 COMPLETE CBC AUTOMATED: CPT | Performed by: HOSPITALIST

## 2024-12-14 PROCEDURE — 36415 COLL VENOUS BLD VENIPUNCTURE: CPT | Performed by: INTERNAL MEDICINE

## 2024-12-14 PROCEDURE — 80048 BASIC METABOLIC PNL TOTAL CA: CPT | Performed by: HOSPITALIST

## 2024-12-14 RX ADMIN — ACETAMINOPHEN 650 MG: 325 TABLET, FILM COATED ORAL at 07:42

## 2024-12-14 ASSESSMENT — ACTIVITIES OF DAILY LIVING (ADL)
ADLS_ACUITY_SCORE: 20
ADLS_ACUITY_SCORE: 20
ADLS_ACUITY_SCORE: 15
ADLS_ACUITY_SCORE: 20
ADLS_ACUITY_SCORE: 15
ADLS_ACUITY_SCORE: 20
ADLS_ACUITY_SCORE: 15
ADLS_ACUITY_SCORE: 20
ADLS_ACUITY_SCORE: 15
ADLS_ACUITY_SCORE: 20
ADLS_ACUITY_SCORE: 20
ADLS_ACUITY_SCORE: 15

## 2024-12-14 NOTE — PHARMACY-ADMISSION MEDICATION HISTORY
Pharmacist Admission Medication History    Admission medication history is complete. The information provided in this note is only as accurate as the sources available at the time of the update.    Information Source(s): Patient via in-person    Pertinent Information: patient took aspirin 325 mg x1 this morning. He does not take aspirin on a daily basis.    Changes made to PTA medication list:  Added: antibiotics  Deleted: None  Changed: None    Allergies reviewed with patient and updates made in EHR: yes    Medication History Completed By: Vern Gallo RPH 12/13/2024 7:22 PM    PTA Med List   Medication Sig Last Dose/Taking    clindamycin (CLEOCIN) 300 MG capsule Take 300 mg by mouth 4 times daily. 12/13/2024 Morning

## 2024-12-14 NOTE — PLAN OF CARE
"[id number]    Patient is A&Ox4 VSS on RA, on tele, SR. No complaints of CP, SOB. Up independent in room. Trops downtrending. Cards to see patient today. Diet advanced. Gave tylenol for headache with improvement.     Goal Outcome Evaluation:      Plan of Care Reviewed With: patient    Overall Patient Progress: improvingOverall Patient Progress: improving    Outcome Evaluation: Denies CP, trops elevated, NPO, Cards consult      Problem: Adult Inpatient Plan of Care  Goal: Plan of Care Review  Description: The Plan of Care Review/Shift note should be completed every shift.  The Outcome Evaluation is a brief statement about your assessment that the patient is improving, declining, or no change.  This information will be displayed automatically on your shift  note.  Outcome: Progressing  Flowsheets (Taken 12/14/2024 1222)  Outcome Evaluation: Denies CP, trops elevated, NPO, Cards consult  Plan of Care Reviewed With: patient  Overall Patient Progress: improving  Goal: Patient-Specific Goal (Individualized)  Description: You can add care plan individualizations to a care plan. Examples of Individualization might be:  \"Parent requests to be called daily at 9am for status\", \"I have a hard time hearing out of my right ear\", or \"Do not touch me to wake me up as it startles  me\".  Outcome: Progressing  Goal: Absence of Hospital-Acquired Illness or Injury  Outcome: Progressing  Intervention: Identify and Manage Fall Risk  Recent Flowsheet Documentation  Taken 12/14/2024 1045 by Lety Patel, RN  Safety Promotion/Fall Prevention: safety round/check completed  Taken 12/14/2024 0928 by Lety Patel, RN  Safety Promotion/Fall Prevention: safety round/check completed  Taken 12/14/2024 0742 by Lety Patel, RN  Safety Promotion/Fall Prevention:   safety round/check completed   lighting adjusted  Intervention: Prevent Skin Injury  Recent Flowsheet Documentation  Taken 12/14/2024 1045 by Lety Patel, RN  Body Position: " position changed independently  Taken 12/14/2024 0928 by Lety Patel, RN  Body Position: position changed independently  Taken 12/14/2024 0742 by Lety Patel, RN  Body Position: position changed independently  Goal: Optimal Comfort and Wellbeing  Outcome: Progressing  Intervention: Monitor Pain and Promote Comfort  Recent Flowsheet Documentation  Taken 12/14/2024 0742 by Lety Patel, RN  Pain Management Interventions: medication (see MAR)  Goal: Readiness for Transition of Care  Outcome: Progressing     Problem: Chest Pain  Goal: Resolution of Chest Pain Symptoms  Outcome: Progressing

## 2024-12-14 NOTE — PROGRESS NOTES
[facility]    Medicine Progress Note - Hospitalist Service    Date of Admission:  12/13/2024    Assessment & Plan    Michael Roman is a 24 year old male without a PMH who presents with chest pain.     NSTEMI   Suspected viral myocarditis  -Patient's symptoms started as a sore throat on Monday, 12/9.  He then had a fever to 104 on Tuesday, 12/10. He was empirically placed on clindamycin while awaiting the strep test.  With his strep test being negative they then tested him for mono yesterday which was also negative.  -PTA at approximately 1030 he woke up with chest discomfort.  Notes that it lasted about 3 hours and then went away.  He did take an aspirin 325 prior to coming to the ER.    -initial EKG with sinus rhythm without any clear ST segment elevation or depression.  High-sensitivity troponin was elevated at 1010.    -Cardiology was contacted and recommended not starting heparin drip given likely myocarditis.   -TTE done with normal EF with possible mild inferolateral wall motion abnormality  -viral panel negative  -troponins remain elevated, cardiology consulted, awaiting further recs  -plan to keep overnight and monitor on tele, no further CP since 1pm yesterday        Diet: Low Saturated Fat Na <2400 mg    DVT Prophylaxis: Pneumatic Compression Devices  Ibarra Catheter: Not present  Lines: None     Cardiac Monitoring: ACTIVE order. Indication: AMI (NSTEMI/ STEMI) (48 hours)  Code Status: Full Code      Social Drivers of Health    Tobacco Use: Unknown (12/12/2024)    Received from [employer]    Patient History     Smoking Tobacco Use: Never     Smokeless Tobacco Use: Unknown     Passive Exposure: Never          Disposition Plan     Medically Ready for Discharge: Anticipated in 2-4 Days         Rohit Mclaughlin DO  Hospitalist Service  [facility]  Securely message with [employer] (more info)  Text page via [employer] Paging/Directory    ______________________________________________________________________    Interval History   No further chest pain since 1pm yesterday. No sob or exertional symptoms but hasn't been out of bed much, no positional symptoms. Numerous questions regarding plan, attempted to answer    Physical Exam   Vital Signs: Temp: 98.5  F (36.9  C) Temp src: Oral BP: 105/51 Pulse: 82   Resp: 20 SpO2: 97 % O2 Device: None (Room air)    Weight: 241 lbs 3.2 oz    Constitutional: awake, alert, and cooperative  Eyes: pupils equal, round and reactive to light and conjunctiva normal  ENT: normocephalic, without obvious abnormality, atraumatic  Respiratory: no increased work of breathing, good air exchange, and clear to auscultation  Cardiovascular: regular rate and rhythm, no murmur noted, and no edema  GI: normal bowel sounds, soft, obese abdomen  Skin: no bruising or bleeding  Neurologic: alert, interactive, no focal deficits    Medical Decision Making       45 MINUTES SPENT BY ME on the date of service doing chart review, history, exam, documentation & further activities per the note.      Data   ------------------------- PAST 24 HR DATA REVIEWED -----------------------------------------------    I have personally reviewed the following data over the past 24 hrs:    9.1  \   13.9   / 275     139 103 10.5 /  94   4.1 23 0.96 \     Trop: 1,472 (HH) BNP: N/A     Procal: 0.06 CRP: 51.77 (H) Lactic Acid: N/A       INR:  N/A PTT:  N/A   D-dimer:  <0.27 Fibrinogen:  N/A       Imaging results reviewed over the past 24 hrs:   Recent Results (from the past 24 hours)   Chest XR,  PA & LAT    Narrative    EXAM: XR CHEST 2 VIEWS  LOCATION: [facility]  DATE: 12/13/2024    INDICATION: Chest pain  COMPARISON: None.      Impression    IMPRESSION: Heart size and pulmonary vascularity are normal. No focal consolidation, pleural effusion, or pneumothorax.   Echocardiogram Complete   Result Value    LVEF  55-60%    Narrative     [id number]  [id number]  [id number]  [id number]^RULA^MYNOR^MIKA     [facility]  Echocardiography Laboratory  [address] [place] [address]     Name: CHHAYA SALMERON  MRN: [id number]  : 2000  Study Date: 2024 06:44 PM  Age: 24 yrs  Gender: Male  Patient Location[facility]  Reason For Study: Chest Pain, Myocardial Infarction  Ordering Physician: MYNOR HORTA  Performed By: Patricio Horowitz RDCS     BSA: 2.3 m2  Height: 72 in  Weight: 242 lb  HR: 65  BP: 132/83 mmHg  ______________________________________________________________________________  Procedure  Complete Echo Adult. Optison (NDC #[id number]) given intravenously.  ______________________________________________________________________________  Interpretation Summary     The left ventricle is normal in size.  There is normal left ventricular wall thickness.  There is normal left ventricular systolic function  The visual ejection fraction is 55-60%.  All the walls are moving.  Possiibly relative mild basal inferolateral wall hypokinesis  The right ventricle is normal in structure, function and size.  There is no valve disease  Normal aortic dimensions  No pericardial effusion     No prior studies for comparison  ______________________________________________________________________________  Left Ventricle  The left ventricle is normal in size. There is normal left ventricular wall  thickness. Left ventricular systolic function is normal. The visual ejection  fraction is 55-60%. All the walls are moving. Possiibly mild basal  inferolateral wall mild hypokinesis.     Right Ventricle  The right ventricle is normal in structure, function and size.     Atria  Normal left atrial size. Right atrial size is normal.     Mitral Valve  The mitral valve is normal in structure and function. There is no mitral  regurgitation noted.     Tricuspid Valve  The tricuspid valve is normal in structure and function. No  tricuspid  regurgitation.     Aortic Valve  Normal tricuspid aortic valve. No aortic regurgitation is present. No aortic  stenosis is present.     Pulmonic Valve  The pulmonic valve is normal in structure and function.     Vessels  Normal size aorta.     Pericardium  The pericardium appears normal.     ______________________________________________________________________________  MMode/2D Measurements & Calculations  IVSd: 1.0 cm  LVIDd: 5.0 cm  LVIDs: 3.2 cm  LVPWd: 1.3 cm  IVC diam: 1.7 cm  FS: 34.9 %     LV mass(C)d: 223.6 grams  LV mass(C)dI: 96.8 grams/m2  Ao root diam: 3.1 cm  LA dimension: 3.9 cm  asc Aorta Diam: 3.0 cm  LA/Ao: 1.3  Ao root diam index Ht(cm/m): 1.7  Ao root diam index BSA (cm/m2): 1.3  Asc Ao diam index BSA (cm/m2): 1.3  Asc Ao diam index Ht(cm/m): 1.6  LA Volume (BP): 35.5 ml  LA Volume Index (BP): 15.4 ml/m2     RV Base: 3.7 cm  RWT: 0.52  TAPSE: 1.9 cm     Doppler Measurements & Calculations  MV E max paul: 85.9 cm/sec  MV A max paul: 38.3 cm/sec  MV E/A: 2.2  MV dec slope: 400.1 cm/sec2  MV dec time: 0.21 sec  PA acc time: 0.12 sec  PI end-d paul: 0.52 cm/sec  TR max paul: 213.4 cm/sec  TR max P.2 mmHg  E/E' av.6  Lateral E/e': 4.7  Medial E/e': 6.5  RV S Paul: 13.3 cm/sec     ______________________________________________________________________________  Report approved by: Giancarlo Epps MD on 2024 07:55 PM

## 2024-12-14 NOTE — ED PROVIDER NOTES
Emergency Department Attending Supervision Note  2/21/2018  4:46 PM      I evaluated this patient in conjunction with Luis Paige PA-C      Briefly,  24 year old male with     Recent URI, now an episode of CP prior to arrival.  Currently no cp/sob Recent travel to [place].  No IVDU.          On my exam,           CV: ppi, regular   Resp: speaking in full sentences without any resp distress  Skin: warm dry well perfused  Neuro: Alert, no gross motor or sensory deficits,  gait stable                Results:    Labs Ordered and Resulted from Time of ED Arrival to Time of ED Departure   TROPONIN T, HIGH SENSITIVITY - Abnormal       Result Value    Troponin T, High Sensitivity 1,010 (*)    TROPONIN T, HIGH SENSITIVITY - Abnormal    Troponin T, High Sensitivity 1,180 (*)    CRP INFLAMMATION - Abnormal    CRP Inflammation 51.55 (*)    BASIC METABOLIC PANEL - Normal    Sodium 137      Potassium 4.3      Chloride 101      Carbon Dioxide (CO2) 23      Anion Gap 13      Urea Nitrogen 11.2      Creatinine 0.83      GFR Estimate >90      Calcium 9.0      Glucose 93     D DIMER QUANTITATIVE - Normal    D-Dimer Quantitative <0.27     INFLUENZA A/B, RSV AND SARS-COV2 PCR - Normal    Influenza A PCR Negative      Influenza B PCR Negative      RSV PCR Negative      SARS CoV2 PCR Negative     PROCALCITONIN - Normal    Procalcitonin 0.06     CBC WITH PLATELETS AND DIFFERENTIAL    WBC Count 9.9      RBC Count 5.19      Hemoglobin 15.0      Hematocrit 43.9      MCV 85      MCH 28.9      MCHC 34.2      RDW 12.2      Platelet Count 289      % Neutrophils 73      % Lymphocytes 17      % Monocytes 8      % Eosinophils 1      % Basophils 0      % Immature Granulocytes 1      NRBCs per 100 WBC 0      Absolute Neutrophils 7.3      Absolute Lymphocytes 1.7      Absolute Monocytes 0.8      Absolute Eosinophils 0.1      Absolute Basophils 0.0      Absolute Immature Granulocytes 0.1      Absolute NRBCs 0.0     BLOOD CULTURE   BLOOD CULTURE          Echocardiogram Complete   Final Result      Chest XR,  PA & LAT   Final Result   IMPRESSION: Heart size and pulmonary vascularity are normal. No focal consolidation, pleural effusion, or pneumothorax.        ECG results from 12/13/24   EKG 12-lead, tracing only     Value    Systolic Blood Pressure     Diastolic Blood Pressure     Ventricular Rate 68    Atrial Rate 68    MN Interval 136    QRS Duration 94        QTc 412    P Axis 28    R AXIS 37    T Axis 26    Interpretation ECG      Sinus rhythm  Normal ECG  No previous ECGs available  Unconfirmed report - interpretation of this ECG is computer generated - see medical record for final interpretation  Confirmed by - EMERGENCY ROOM, PHYSICIAN ([id number]),  Akash Ramos ([id number]) on 12/13/2024 3:53:11 PM               My impression is     Likely post viral perimyocarditis.  CXR normal, ecg without concern for acute occlusive coronary change.  Currently Mira Smith discussed with cardiology.  ASA, ECHO, admit.       Diagnosis    ICD-10-CM    1. Chest pain  R07.9     resolved      2. Elevated troponin  R79.89       3. Myocarditis (H)  I51.4     suspected              Ron Gutierrez MD  hospit[facility]      Ron Gutierrez MD  12/14/24 0101

## 2024-12-14 NOTE — CONSULTS
Inpatient Cardiology Consultation.   [facility]  Date of Admission: 12/13/2024  Date of Consult: December 14, 2024      DIAGNOSES/ASSESSMENT:    Acute Myocarditis. 24-year-old male admitted with presumed acute myocarditis with significantly elevated hs-troponin in 1000s (plateaued, downtrending), elevated inflammatory markers and recent upper respiratory infection. Presenting with constant non-coronary chest pain that has completely resolved for the last 24 hours.  Benign EKG, no LV dysfunction but small area of inferobasal hypokinesis consistent with the diagnosis.  Pericarditis unlikely due to normal appearance of pericardium, normal EKG. Diagnosis consistent with uncomplicated acute myocarditis.  Recent upper respiratory infection, resolved.  BMI 33.    PLAN:    No chest pain for 24 hours, no arrhythmias, normal LV function on echo, hemodynamically stable.  Recommend trending troponins.  If troponins continue to climb, will need further inpatient evaluation with coronary angiogram and cardiac MRI.  If they downtrend, this can be pursued as an outpatient.  Continue telemetry.   Supportive care:  - Tylenol with NSAIDs for pain management  Cardiology will follow.  Plan of care discussed with patient and his brother who was present in the room.  Patient's RN updated.      Sindy George MD, MD FACC  Cardiology    Total time today 62 minutes.      CODE STATUS:  Full Code      REASON FOR CONSULT:  Acute myocarditis.    HISTORY OF PRESENT ILLNESS:  Michael Roman is a 24 year old male presenting with one day of constant chest tightness and associated shortness of breath. He denies radiation of the chest pain, diaphoresis. He reports a recent history of upper respiratory infection with fever and sore throat  days ago, which was treated with antibiotics after negative strep and mono tests. His respiratory symptoms have since resolved.    Evaluation in the ER revealed stable vitals,  sinus rhythm in the 80s, benign EKG without pericarditis or ischemia, markedly elevated high-sensitivity troponin T (Initial [id number], peaked 1666, down to 1472 ng/L), elevated CRP of 52, no leukocytosis, normal platelet count, hemoglobin 15.0, normal serum procalcitonin, negative D-dimer, creatinine 0.96, normal electrolytes.  Chest x-ray negative.    No known chronic medical diseases or cardiac history. He denies tobacco, or recreational drug use, including IV drug use.  Alcohol use over the weekend.    His family history is negative for CAD, cardiomyopathy, arrhythmias, or sudden death.    On examination, he appears comfortable at rest. He has normal JVP, regular heart sounds with no murmurs, pericardial friction rub, or pleural rub. His lungs are clear to auscultation. His abdomen is soft and non-tender. There is no lower extremity edema noted.    Vital Signs: BP: 105/59 mmHg, HR: 82 bpm, sinus rhythm. Temp: Afebrile, O2 sat: 97%. Normal JVP, apical impulse, heart sounds. No murmurs, rubs. Clear lungs. Soft, non-tender abdomen. No edema.    Diagnostic Tests and Labs:    Echo (Dec 13 2024): LV EF 55%, normal size & systolic function, mild basal inferobasal wall hypokinesis. RV normal. No valve disease or pericardial effusion. Normal pericardium.    EKG (Dec 13 2024): Normal sinus rhythm, cardiac intervals. No ST changes, no NJ depression, pathologic Q waves, or acute ischemia.  Normal cardiac intervals.    Labs: As documented above.    CXR: Negative    Influenza, SARS, RSV PCR: Negative      REVIEW OF SYSTEMS:  A comprehensive 10 point review of systems was completed and the pertinent positives are documented in history of present illness.    FAMILY HISTORY:  His family history is negative for CAD, cardiomyopathy, arrhythmias, or sudden death.    MEDICATIONS:  Prior to Admission Medications   Prescriptions Last Dose Informant Patient Reported? Taking?   clindamycin (CLEOCIN) 300 MG capsule 12/13/2024  Morning  Yes Yes   Sig: Take 300 mg by mouth 4 times daily.      Facility-Administered Medications: None       HOME MEDICATIONS:  Prior to Admission Medications   Prescriptions Last Dose Informant Patient Reported? Taking?   clindamycin (CLEOCIN) 300 MG capsule 12/13/2024 Morning  Yes Yes   Sig: Take 300 mg by mouth 4 times daily.      Facility-Administered Medications: None       ALLERGIES:  Allergies   Allergen Reactions    Shellfish-Derived Products Itching       PAST MEDICAL HISTORY:  No past medical history on file.    PAST SURGICAL HISTORY:  Past Surgical History:   Procedure Laterality Date    ORTHOPEDIC SURGERY Right     ORIF right ankle       SOCIAL HISTORY:   Michael Roman  reports that he has never smoked. He has never used smokeless tobacco. He reports current alcohol use. He reports that he does not currently use drugs.      PHYSICAL EXAMINATION:  Temp: 98.5  F (36.9  C) Temp src: Oral BP: 105/51 Pulse: 82   Resp: 20 SpO2: 97 % O2 Device: None (Room air)    12/09 0700 - 12/14 0659  In: 3 [I.V.:3]  Out: -   Net: 3  Vitals:    12/13/24 1506 12/13/24 1935 12/14/24 0633   Weight: 110 kg (242 lb 8.1 oz) 109.4 kg (241 lb 3.2 oz) 109.4 kg (241 lb 3.2 oz)       Clinically Significant Risk Factors Present on Admission                             # Obesity: Estimated body mass index is 32.71 kg/m  as calculated from the following:    Height as of this encounter: 1.829 m (6').    Weight as of this encounter: 109.4 kg (241 lb 3.2 oz).               Sindy George MD, MD    This note was completed in part using dictation via the Dragon voice recognition software. Some word and grammatical errors may occur and must be interpreted in the appropriate clinical context. If there are any questions pertaining to this issue, please contact me for further clarification.

## 2024-12-14 NOTE — PLAN OF CARE
"Temp: 98.2  F (36.8  C) Temp src: Oral BP: 110/62 Pulse: 84   Resp: 16 SpO2: 100 % O2 Device: None (Room air)       A&Ox4. VSS. Up ad peter. Troponins still trending up, last 1666. Continues to deny chest pain. Possible viral induced myocarditis. Cardiology consulted. NPO since midnight.         Goal Outcome Evaluation:      Plan of Care Reviewed With: patient    Overall Patient Progress: no changeOverall Patient Progress: no change    Outcome Evaluation: Denies CP, trending trop. NPO since midnight. Cards consulted.        Problem: Adult Inpatient Plan of Care  Goal: Plan of Care Review  Description: The Plan of Care Review/Shift note should be completed every shift.  The Outcome Evaluation is a brief statement about your assessment that the patient is improving, declining, or no change.  This information will be displayed automatically on your shift  note.  Outcome: Progressing  Flowsheets (Taken 12/14/2024 0538)  Outcome Evaluation: Denies CP, trending trop. NPO since midnight. Cards consulted.  Plan of Care Reviewed With: patient  Overall Patient Progress: no change  Goal: Patient-Specific Goal (Individualized)  Description: You can add care plan individualizations to a care plan. Examples of Individualization might be:  \"Parent requests to be called daily at 9am for status\", \"I have a hard time hearing out of my right ear\", or \"Do not touch me to wake me up as it startles  me\".  Outcome: Progressing  Goal: Absence of Hospital-Acquired Illness or Injury  Outcome: Progressing  Intervention: Identify and Manage Fall Risk  Recent Flowsheet Documentation  Taken 12/13/2024 1935 by Karina Onofre, RN  Safety Promotion/Fall Prevention:   room near nurse's station   room organization consistent   safety round/check completed   supervised activity  Intervention: Prevent Skin Injury  Recent Flowsheet Documentation  Taken 12/13/2024 1935 by Karina Onofre RN  Body Position: position changed independently  Intervention: " Prevent Infection  Recent Flowsheet Documentation  Taken 12/13/2024 1935 by Karina Onofre, RN  Infection Prevention:   rest/sleep promoted   single patient room provided  Goal: Optimal Comfort and Wellbeing  Outcome: Progressing  Goal: Readiness for Transition of Care  Outcome: Progressing  Intervention: Mutually Develop Transition Plan  Recent Flowsheet Documentation  Taken 12/13/2024 1954 by Karina Onofre, RN  Equipment Currently Used at Home: none     Problem: Chest Pain  Goal: Resolution of Chest Pain Symptoms  Outcome: Progressing

## 2024-12-14 NOTE — ED PROVIDER NOTES
Emergency Department Note      History of Present Illness     Chief Complaint   Chest Pain      HPI   Michael Roman is a 24 year old male otherwise healthy who presents after an episode of chest pain this morning.  The patient states that he had chest tightness that lasted for about 3 hours this morning that has now resolved. Nothing made it better or worse..  No radiation to the jaw or shoulder.  He felt short of breath earlier but does not feel short of breath currently and denies any current chest pain or tightness or pressure.  No vomiting or diaphoresis.  No leg swelling or calf pain.  He notes he was seen a couple of days ago for fever and sore throat had negative strep and mono tests and was prescribed antibiotics.  He notes those symptoms have since resolved.  He does not use IV drugs.  He did travel to [place] a few weeks ago but did not travel anywhere else.  He does note this was obviously a long plane ride.  He has not had any recent surgeries or overnight hospitalizations.  No rash.  No family history of premature CAD.  He does not smoke. He took 325 mg of aspirin earlier.    Independent Historian   None    Review of External Notes   I reviewed Constance Fishman PA-C note from [facility] today where pt seen for chest pain.  I reviewed negative Strep from 12/10/24 and negative Mono from yesterday.    Past Medical History     Medical History and Problem List   No past medical history on file.    Medications   No current outpatient medications on file.      Surgical History   Past Surgical History:   Procedure Laterality Date    ORTHOPEDIC SURGERY Right     ORIF right ankle       Physical Exam     Patient Vitals for the past 24 hrs:   BP Temp Temp src Pulse Resp SpO2 Height Weight   12/13/24 1506 132/83 97  F (36.1  C) Temporal 73 18 98 % 1.829 m (6') 110 kg (242 lb 8.1 oz)     Physical Exam  General: Awake, alert, non-toxic.  Head:  Scalp is NC/AT  Eyes:  Conjunctiva normal, PERRL  ENT:  The external  nose and ears are normal.     Oropharynx mildly red posteriorly.  Otherwise clear, uvula midline. No trismus or sublingual/submandibular swelling.  Neck:  Normal range of motion without rigidity.  CV:  Regular rate and rhythm    No pathologic murmur, rubs, or gallops.  Resp:  Breath sounds are clear bilaterally    Non-labored, no retractions or accessory muscle use  Abdomen: Abdomen is soft, no distension, no tenderness, no masses  MS:  No lower extremity edema/swelling. Extremities without joint swelling or redness.  Skin:  Warm and dry, No rash or lesions noted.  Neuro:  Alert and oriented.  GCS 15 Moves all extremities normal.  No facial asymmetry. Gait normal.  Psych: Awake. Alert. Normal affect. Appropriate interactions.    Diagnostics     Lab Results   Labs Ordered and Resulted from Time of ED Arrival to Time of ED Departure   TROPONIN T, HIGH SENSITIVITY - Abnormal       Result Value    Troponin T, High Sensitivity 1,010 (*)    BASIC METABOLIC PANEL - Normal    Sodium 137      Potassium 4.3      Chloride 101      Carbon Dioxide (CO2) 23      Anion Gap 13      Urea Nitrogen 11.2      Creatinine 0.83      GFR Estimate >90      Calcium 9.0      Glucose 93     D DIMER QUANTITATIVE - Normal    D-Dimer Quantitative <0.27     PROCALCITONIN - Normal    Procalcitonin 0.06     CBC WITH PLATELETS AND DIFFERENTIAL    WBC Count 9.9      RBC Count 5.19      Hemoglobin 15.0      Hematocrit 43.9      MCV 85      MCH 28.9      MCHC 34.2      RDW 12.2      Platelet Count 289      % Neutrophils 73      % Lymphocytes 17      % Monocytes 8      % Eosinophils 1      % Basophils 0      % Immature Granulocytes 1      NRBCs per 100 WBC 0      Absolute Neutrophils 7.3      Absolute Lymphocytes 1.7      Absolute Monocytes 0.8      Absolute Eosinophils 0.1      Absolute Basophils 0.0      Absolute Immature Granulocytes 0.1      Absolute NRBCs 0.0     INFLUENZA A/B, RSV AND SARS-COV2 PCR   TROPONIN T, HIGH SENSITIVITY   BLOOD CULTURE    BLOOD CULTURE       Imaging   Chest XR,  PA & LAT   Final Result   IMPRESSION: Heart size and pulmonary vascularity are normal. No focal consolidation, pleural effusion, or pneumothorax.      Echocardiogram Complete    (Results Pending)       ECG results from 12/13/24   EKG 12-lead, tracing only     Value    Systolic Blood Pressure     Diastolic Blood Pressure     Ventricular Rate 68    Atrial Rate 68    KS Interval 136    QRS Duration 94        QTc 412    P Axis 28    R AXIS 37    T Axis 26    Interpretation ECG      Sinus rhythm  Normal ECG  No previous ECGs available  Unconfirmed report - interpretation of this ECG is computer generated - see medical record for final interpretation  Confirmed by - EMERGENCY ROOM, PHYSICIAN ([id number]),  Akash Ramos ([id number]) on 12/13/2024 3:53:11 PM     Interpreted by Dr. Carr.      Independent Interpretation   CXR: No pneumothorax, infiltrate, pleural effusion, pulmonary edema, cardiomegaly, or mediastinal widening.    ED Course      Medications Administered   Medications   aspirin (ASA) chewable tablet 324 mg (has no administration in time range)       Procedures   Procedures     Discussion of Management   Cardiology, Dr. Dupont.  Agrees with admission and holding off on heparin infusion.  Patient ok to stay at [facility] right now regardless of echo results given pain free, normal ECG, stable vitals.  Discussed with hospitalist Dr. Cifuentes who agrees to accept for admission to Cardiac telemetry bed.  ED Course    I performed an eval of the pt.  I re-checked the pt. And discussed findings.    Additional Documentation  None    Medical Decision Making / Diagnosis     CMS Diagnoses: None    MIPS       None    MDM   Michael Roman is a 24 year old male otherwise healthy who presents after resolved episode of chest pain this morning.  Broad differential considered.  EKG is normal.  He is asymptomatic at present.  No ST changes or evidence of ischemia or arrhythmia.  He is  very well-appearing and vitally stable.  His troponin surprisingly did return markedly elevated.  Suspicion is for myocarditis likely viral induced.  He has no ongoing fevers and his procalcitonin is low white count is normal, though obviously has been on antibiotics which would cloud this some but his sore throat and other symptoms have resolved.  I will send blood cultures which are pending..  No pericarditis changes on EKG.  He is not tachycardic.  He does not have any evidence of CHF and his chest x-ray is clear.  His D-dimer is normal low clinical suspicion for PE essentially ruling this out.  He has no ongoing symptoms no mediastinal widening and no other high risk factors to suggest aortic dissection.  He did take 325 of aspirin already at home.  I discussed his case with cardiology as low suspicion for obstructive CAD/ischemic disease in this young otherwise completely healthy individual with no risk factors resolved pain and obviously preceding viral syndrome we will hold off on heparinization at this time they feel he is safe to remain at [facility].  Echocardiogram ordered and pending.  No indication for antibiotics no IV drug use or risk factors for endocarditis.  Will send COVID and flu as well which is pending.  Discussed with hospitalist who agrees to except a cardiac telemetry bed.  Staffed with and discussed with Dr. Gutierrez ED attending as well.    Disposition   The patient was admitted to the hospital.     Diagnosis     ICD-10-CM    1. Chest pain  R07.9     resolved      2. Elevated troponin  R79.89       3. Myocarditis (H)  I51.4     suspected           Discharge Medications   New Prescriptions    No medications on file            Luis Paige PA-C  12/13/24 1214

## 2024-12-14 NOTE — ED NOTES
[facility]  ED Nurse Handoff Report    ED Chief complaint: Chest Pain  . ED Diagnosis:   Final diagnoses:   Chest pain - resolved   Elevated troponin   Myocarditis (H) - suspected       Allergies: No Known Allergies    Code Status: Full Code    Activity level - Baseline/Home:  independent.  Activity Level - Current:   independent.   Lift room needed: No.   Bariatric: No   Needed: No   Isolation: Yes.   Infection: Not Applicable.     Respiratory status: Room air    Vital Signs (within 30 minutes):   Vitals:    12/13/24 1506   BP: 132/83   Pulse: 73   Resp: 18   Temp: 97  F (36.1  C)   TempSrc: Temporal   SpO2: 98%   Weight: 110 kg (242 lb 8.1 oz)   Height: 1.829 m (6')       Cardiac Rhythm:  ,      Pain level:    Patient confused: No.   Patient Falls Risk: nonskid shoes/slippers when out of bed.   Elimination Status: Has voided     Patient Report - Initial Complaint: Chest pain.   Focused Assessment:  Patient's symptoms started as a sore throat on Monday, 12/9. He then had a fever to 104 on Tuesday, 12/10. He had some shortness of breath at that time. He also endorsed some lightheadedness/dizziness and headache. He was seen in urgent care and had a rapid strep test that was negative. He was empirically placed on clindamycin while awaiting the strep test. With his strep test being negative they then tested him for mono yesterday which was also negative. Today at approximately 1030 he woke up with chest discomfort. Notes that it lasted about 3 hours and then went away. He did take an aspirin 325 prior to coming to the ER. He denies current chest pain. Denies any orthopnea or PND. Denies any positional nature of the chest pain. No leg swelling or leg pain. Denies any current shortness of breath. No cardiac history. No family history of cardiac disease. He is not taking any prescription medications chronically.     Abnormal Results:   Labs Ordered and Resulted from Time of ED Arrival to  Time of ED Departure   TROPONIN T, HIGH SENSITIVITY - Abnormal       Result Value    Troponin T, High Sensitivity 1,010 (*)    BASIC METABOLIC PANEL - Normal    Sodium 137      Potassium 4.3      Chloride 101      Carbon Dioxide (CO2) 23      Anion Gap 13      Urea Nitrogen 11.2      Creatinine 0.83      GFR Estimate >90      Calcium 9.0      Glucose 93     D DIMER QUANTITATIVE - Normal    D-Dimer Quantitative <0.27     PROCALCITONIN - Normal    Procalcitonin 0.06     CBC WITH PLATELETS AND DIFFERENTIAL    WBC Count 9.9      RBC Count 5.19      Hemoglobin 15.0      Hematocrit 43.9      MCV 85      MCH 28.9      MCHC 34.2      RDW 12.2      Platelet Count 289      % Neutrophils 73      % Lymphocytes 17      % Monocytes 8      % Eosinophils 1      % Basophils 0      % Immature Granulocytes 1      NRBCs per 100 WBC 0      Absolute Neutrophils 7.3      Absolute Lymphocytes 1.7      Absolute Monocytes 0.8      Absolute Eosinophils 0.1      Absolute Basophils 0.0      Absolute Immature Granulocytes 0.1      Absolute NRBCs 0.0     INFLUENZA A/B, RSV AND SARS-COV2 PCR   TROPONIN T, HIGH SENSITIVITY   BLOOD CULTURE   BLOOD CULTURE        Chest XR,  PA & LAT   Final Result   IMPRESSION: Heart size and pulmonary vascularity are normal. No focal consolidation, pleural effusion, or pneumothorax.      Echocardiogram Complete    (Results Pending)       Treatments provided: See MAR  Family Comments: none  OBS brochure/video discussed/provided to patient:  No  ED Medications:   Medications   aspirin (ASA) chewable tablet 324 mg (has no administration in time range)       Drips infusing:  No  For the majority of the shift this patient was Green.   Interventions performed were n/a.    Sepsis treatment initiated: No    Cares/treatment/interventions/medications to be completed following ED care: n/a    ED Nurse Name: Martina De Los Santos RN  6:10 PM  RECEIVING UNIT ED HANDOFF REVIEW    Above ED Nurse Handoff Report was reviewed:  Yes  Reviewed by: Torie Ryder RN on December 13, 2024 at 6:16 PM   I Alyse called the ED to inform them the note was read: Yes

## 2024-12-15 VITALS
SYSTOLIC BLOOD PRESSURE: 124 MMHG | BODY MASS INDEX: 31.64 KG/M2 | WEIGHT: 233.6 LBS | HEART RATE: 74 BPM | RESPIRATION RATE: 18 BRPM | TEMPERATURE: 98.8 F | DIASTOLIC BLOOD PRESSURE: 62 MMHG | HEIGHT: 72 IN | OXYGEN SATURATION: 97 %

## 2024-12-15 LAB
ANION GAP SERPL CALCULATED.3IONS-SCNC: 15 MMOL/L (ref 7–15)
BUN SERPL-MCNC: 10.3 MG/DL (ref 6–20)
CALCIUM SERPL-MCNC: 9.7 MG/DL (ref 8.8–10.4)
CHLORIDE SERPL-SCNC: 100 MMOL/L (ref 98–107)
CREAT SERPL-MCNC: 0.89 MG/DL (ref 0.67–1.17)
EGFRCR SERPLBLD CKD-EPI 2021: >90 ML/MIN/1.73M2
ERYTHROCYTE [DISTWIDTH] IN BLOOD BY AUTOMATED COUNT: 11.9 % (ref 10–15)
GLUCOSE SERPL-MCNC: 89 MG/DL (ref 70–99)
HCO3 SERPL-SCNC: 21 MMOL/L (ref 22–29)
HCT VFR BLD AUTO: 42.2 % (ref 40–53)
HGB BLD-MCNC: 14.5 G/DL (ref 13.3–17.7)
MCH RBC QN AUTO: 28.9 PG (ref 26.5–33)
MCHC RBC AUTO-ENTMCNC: 34.4 G/DL (ref 31.5–36.5)
MCV RBC AUTO: 84 FL (ref 78–100)
PLATELET # BLD AUTO: 287 10E3/UL (ref 150–450)
POTASSIUM SERPL-SCNC: 4 MMOL/L (ref 3.4–5.3)
RBC # BLD AUTO: 5.01 10E6/UL (ref 4.4–5.9)
SODIUM SERPL-SCNC: 136 MMOL/L (ref 135–145)
TROPONIN T SERPL HS-MCNC: 691 NG/L
WBC # BLD AUTO: 9.2 10E3/UL (ref 4–11)

## 2024-12-15 PROCEDURE — 99239 HOSP IP/OBS DSCHRG MGMT >30: CPT | Performed by: HOSPITALIST

## 2024-12-15 PROCEDURE — 84484 ASSAY OF TROPONIN QUANT: CPT | Performed by: HOSPITALIST

## 2024-12-15 PROCEDURE — 82565 ASSAY OF CREATININE: CPT | Performed by: HOSPITALIST

## 2024-12-15 PROCEDURE — 36415 COLL VENOUS BLD VENIPUNCTURE: CPT | Performed by: HOSPITALIST

## 2024-12-15 PROCEDURE — 85014 HEMATOCRIT: CPT | Performed by: HOSPITALIST

## 2024-12-15 PROCEDURE — 80048 BASIC METABOLIC PNL TOTAL CA: CPT | Performed by: HOSPITALIST

## 2024-12-15 ASSESSMENT — ACTIVITIES OF DAILY LIVING (ADL)
ADLS_ACUITY_SCORE: 20

## 2024-12-15 NOTE — PLAN OF CARE
Patient's After Visit Summary was reviewed with patient.   Patient verbalized understanding of After Visit Summary, recommended follow up and was given an opportunity to ask questions.   Discharge medications sent home with patient/family: No   Discharged with brother

## 2024-12-15 NOTE — PLAN OF CARE
"Temp: 99  F (37.2  C) Temp src: Oral BP: 119/68 Pulse: 94 (Tele)   Resp: 18 SpO2: 99 % O2 Device: None (Room air)       A&Ox4. VSS. Up ad peter. Continues to deny chest pain. No complaints overall. No changes noted with telemetry overnight. Remains in SR. Will likely discharge home today.     Goal Outcome Evaluation:      Plan of Care Reviewed With: patient    Overall Patient Progress: improvingOverall Patient Progress: improving    Outcome Evaluation: No pain, no complaints. VSS      Problem: Adult Inpatient Plan of Care  Goal: Plan of Care Review  Description: The Plan of Care Review/Shift note should be completed every shift.  The Outcome Evaluation is a brief statement about your assessment that the patient is improving, declining, or no change.  This information will be displayed automatically on your shift  note.  Outcome: Progressing  Flowsheets (Taken 12/15/2024 0451)  Outcome Evaluation: No pain, no complaints. VSS  Plan of Care Reviewed With: patient  Overall Patient Progress: improving  Goal: Patient-Specific Goal (Individualized)  Description: You can add care plan individualizations to a care plan. Examples of Individualization might be:  \"Parent requests to be called daily at 9am for status\", \"I have a hard time hearing out of my right ear\", or \"Do not touch me to wake me up as it startles  me\".  Outcome: Progressing  Goal: Absence of Hospital-Acquired Illness or Injury  Outcome: Progressing  Intervention: Identify and Manage Fall Risk  Recent Flowsheet Documentation  Taken 12/14/2024 2125 by Karina Onofre RN  Safety Promotion/Fall Prevention:   clutter free environment maintained   room near nurse's station   room organization consistent   safety round/check completed  Intervention: Prevent Skin Injury  Recent Flowsheet Documentation  Taken 12/14/2024 2125 by Karina Onofre RN  Body Position: position changed independently  Intervention: Prevent and Manage VTE (Venous Thromboembolism) Risk  Recent " Flowsheet Documentation  Taken 12/14/2024 2125 by Karina Onofre RN  VTE Prevention/Management: (ambulatory) patient refused intervention  Intervention: Prevent Infection  Recent Flowsheet Documentation  Taken 12/14/2024 2125 by Karina Onofre RN  Infection Prevention:   rest/sleep promoted   single patient room provided   hand hygiene promoted  Goal: Optimal Comfort and Wellbeing  Outcome: Progressing  Goal: Readiness for Transition of Care  Outcome: Progressing     Problem: Chest Pain  Goal: Resolution of Chest Pain Symptoms  Outcome: Progressing

## 2024-12-15 NOTE — PLAN OF CARE
"  Problem: Adult Inpatient Plan of Care  Goal: Plan of Care Review  Description: The Plan of Care Review/Shift note should be completed every shift.  The Outcome Evaluation is a brief statement about your assessment that the patient is improving, declining, or no change.  This information will be displayed automatically on your shift  note.  Outcome: Progressing  Flowsheets (Taken 12/14/2024 8832)  Outcome Evaluation: Denies pain.  Plan of Care Reviewed With: patient  Overall Patient Progress: no change  Goal: Patient-Specific Goal (Individualized)  Description: You can add care plan individualizations to a care plan. Examples of Individualization might be:  \"Parent requests to be called daily at 9am for status\", \"I have a hard time hearing out of my right ear\", or \"Do not touch me to wake me up as it startles  me\".  Outcome: Progressing  Goal: Absence of Hospital-Acquired Illness or Injury  Outcome: Progressing  Goal: Optimal Comfort and Wellbeing  Outcome: Progressing  Goal: Readiness for Transition of Care  Outcome: Progressing   Goal Outcome Evaluation:      Plan of Care Reviewed With: patient    Overall Patient Progress: no changeOverall Patient Progress: no change    Outcome Evaluation: Denies pain.      "

## 2024-12-15 NOTE — DISCHARGE SUMMARY
[facility]  Hospitalist Discharge Summary      Date of Admission:  12/13/2024  Date of Discharge:  12/15/2024  Discharging Provider: Rohit Mclaughlin DO  Discharge Service: Hospitalist Service    Discharge Diagnoses   NSTEMI   Suspected viral myocarditis      Follow-ups Needed After Discharge   Follow-up Appointments       Follow-up and recommended labs and tests       Follow up with primary care provider, Physician No Ref-Primary, within 7 days for hospital follow- up.  No follow up labs or test are needed.    Follow up with cardiology in 2-4 weeks or as directed                Unresulted Labs Ordered in the Past 30 Days of this Admission       Date and Time Order Name Status Description    12/13/2024  5:10 PM Blood Culture Arm, Right Preliminary     12/13/2024  5:10 PM Blood Culture Arm, Left Preliminary         These results will be followed up by PCP    Discharge Disposition   Discharged to home  Condition at discharge: Stable    Hospital Course   Michael Roman is a 24 year old male without a PMH who presents with chest pain.     NSTEMI   Suspected viral myocarditis  -Patient's symptoms started as a sore throat on Monday, 12/9.  He then had a fever to 104 on Tuesday, 12/10. He was empirically placed on clindamycin while awaiting the strep test.  With his strep test being negative they then tested him for mono yesterday which was also negative.  -PTA at approximately 1030 he woke up with chest discomfort.  Notes that it lasted about 3 hours and then went away.  He did take an aspirin 325 prior to coming to the ER.    -initial EKG with sinus rhythm without any clear ST segment elevation or depression.  High-sensitivity troponin was elevated at 1010.    -Cardiology was contacted and recommended not starting heparin drip given likely myocarditis.   -TTE done with normal EF with possible mild inferolateral wall motion abnormality  -viral panel negative  -troponin's improved to 691 day  of discharge, symptom free  -cardiology following and plan for outpt CT coronary angiogram and cardiac MRI with clinic follow-up     Consultations This Hospital Stay   CARDIOLOGY IP CONSULT    Code Status   Full Code    Time Spent on this Encounter   I, Rohit Mclaughlin DO, personally saw the patient today and spent greater than 30 minutes discharging this patient.       Rohit Mclaughlin DO  [place] [address] MEDICAL SURGICAL  [address] [facility] [address]  Phone: [phone]  Fax: [phone]  ______________________________________________________________________    Physical Exam   Vital Signs: Temp: 98.8  F (37.1  C) Temp src: Oral BP: 124/62 Pulse: 74   Resp: 18 SpO2: 97 % O2 Device: None (Room air)    Weight: 233 lbs 9.6 oz  Face to face completed day of discharge       Primary Care Physician   Physician No Ref-Primary    Discharge Orders      MRI Cardiac w/contrast     Follow-Up with Cardiology      Reason for your hospital stay    Admitted for myocarditis and seen by cardiology, followup outpt and plan for imaging as outpt     Follow-up and recommended labs and tests     Follow up with primary care provider, Physician No Ref-Primary, within 7 days for hospital follow- up.  No follow up labs or test are needed.    Follow up with cardiology in 2-4 weeks or as directed     Activity    Your activity upon discharge: activity as tolerated     Diet    Follow this diet upon discharge: Current Diet:Orders Placed This Encounter      Low Saturated Fat Na <2400 mg     CTA Angiogram coronary artery       Significant Results and Procedures   Most Recent 3 CBC's:  Recent Labs   Lab Test 12/15/24  0813 12/14/24  0719 12/13/24  1616   WBC 9.2 9.1 9.9   HGB 14.5 13.9 15.0   MCV 84 84 85    275 289     Most Recent 3 BMP's:  Recent Labs   Lab Test 12/15/24  0813 12/14/24  0719 12/13/24  1616    139 137   POTASSIUM 4.0 4.1 4.3   CHLORIDE 100 103 101   CO2 21* 23 23   BUN 10.3 10.5 11.2   CR  0.89 0.96 0.83   ANIONGAP 15 13 13   NAMAN 9.7 9.5 9.0   GLC 89 94 93     Most Recent 2 LFT's:No lab results found.  Most Recent 3 INR's:No lab results found.  Most Recent 3 Hemoglobins:  Recent Labs   Lab Test 12/15/24  0813 24  0719 24  1616   HGB 14.5 13.9 15.0     Most Recent 3 Troponin's:No lab results found.  Most Recent 3 BNP's:No lab results found.  Most Recent D-dimer:  Recent Labs   Lab Test 24  1616   DD <0.27   ,   Results for orders placed or performed during the hospital encounter of 24   Chest XR,  PA & LAT    Narrative    EXAM: XR CHEST 2 VIEWS  LOCATION: [facility]  DATE: 2024    INDICATION: Chest pain  COMPARISON: None.      Impression    IMPRESSION: Heart size and pulmonary vascularity are normal. No focal consolidation, pleural effusion, or pneumothorax.   Echocardiogram Complete     Value    LVEF  55-60%    Narrative    [id number]  [id number]  [id number]^RULA^MYNOR^MIKA     [facility]  Echocardiography Laboratory  [address] [place] [address]     Name: CHHAYA SALMERON  MRN: [id number]  : 2000  Study Date: 2024 06:44 PM  Age: 24 yrs  Gender: Male  Patient Location: [facility]  Reason For Study: Chest Pain, Myocardial Infarction  Ordering Physician: MYNOR HORTA  Performed By: Patricio Horowitz RDCS     BSA: 2.3 m2  Height: 72 in  Weight: 242 lb  HR: 65  BP: 132/83 mmHg  ______________________________________________________________________________  Procedure  Complete Echo Adult. Optison (NDC #[id number]) given intravenously.  ______________________________________________________________________________  Interpretation Summary     The left ventricle is normal in size.  There is normal left ventricular wall thickness.  There is normal left ventricular systolic function  The visual ejection fraction is 55-60%.  All the walls are moving.  Possiibly relative mild basal inferolateral wall  hypokinesis  The right ventricle is normal in structure, function and size.  There is no valve disease  Normal aortic dimensions  No pericardial effusion     No prior studies for comparison  ______________________________________________________________________________  Left Ventricle  The left ventricle is normal in size. There is normal left ventricular wall  thickness. Left ventricular systolic function is normal. The visual ejection  fraction is 55-60%. All the walls are moving. Possiibly mild basal  inferolateral wall mild hypokinesis.     Right Ventricle  The right ventricle is normal in structure, function and size.     Atria  Normal left atrial size. Right atrial size is normal.     Mitral Valve  The mitral valve is normal in structure and function. There is no mitral  regurgitation noted.     Tricuspid Valve  The tricuspid valve is normal in structure and function. No tricuspid  regurgitation.     Aortic Valve  Normal tricuspid aortic valve. No aortic regurgitation is present. No aortic  stenosis is present.     Pulmonic Valve  The pulmonic valve is normal in structure and function.     Vessels  Normal size aorta.     Pericardium  The pericardium appears normal.     ______________________________________________________________________________  MMode/2D Measurements & Calculations  IVSd: 1.0 cm  LVIDd: 5.0 cm  LVIDs: 3.2 cm  LVPWd: 1.3 cm  IVC diam: 1.7 cm  FS: 34.9 %     LV mass(C)d: 223.6 grams  LV mass(C)dI: 96.8 grams/m2  Ao root diam: 3.1 cm  LA dimension: 3.9 cm  asc Aorta Diam: 3.0 cm  LA/Ao: 1.3  Ao root diam index Ht(cm/m): 1.7  Ao root diam index BSA (cm/m2): 1.3  Asc Ao diam index BSA (cm/m2): 1.3  Asc Ao diam index Ht(cm/m): 1.6  LA Volume (BP): 35.5 ml  LA Volume Index (BP): 15.4 ml/m2     RV Base: 3.7 cm  RWT: 0.52  TAPSE: 1.9 cm     Doppler Measurements & Calculations  MV E max leonard: 85.9 cm/sec  MV A max leonard: 38.3 cm/sec  MV E/A: 2.2  MV dec slope: 400.1 cm/sec2  MV dec time: 0.21 sec  PA acc  time: 0.12 sec  PI end-d paul: 0.52 cm/sec  TR max paul: 213.4 cm/sec  TR max P.2 mmHg  E/E' av.6  Lateral E/e': 4.7  Medial E/e': 6.5  RV S Paul: 13.3 cm/sec     ______________________________________________________________________________  Report approved by: Giancarlo Epps MD on 2024 07:55 PM             Discharge Medications   Current Discharge Medication List        STOP taking these medications       clindamycin (CLEOCIN) 300 MG capsule Comments:   Reason for Stopping:             Allergies   Allergies   Allergen Reactions    Shellfish-Derived Products Itching

## 2024-12-15 NOTE — PROGRESS NOTES
[facility].  Inpatient Cardiology Progress Note.  Date of Service: December 15, 2024      INTERVAL HISTORY:  Michael Roman is a 24 year old male admitted on 12/13/2024 with acute myocarditis preceded by URI symptoms, normal LVEF of 55%, mild basal inferolateral hypokinesis, no pericardial effusion, no evidence of pericarditis.  High-sensitivity troponin T peaked to 1666, down trended this morning to 691.  He has not had chest pain since admission.  Not requiring painkillers.  Physical examination benign.  No arrhythmias.  Not immunosuppressed.      DIAGNOSES/ASSESSMENT:  Uncomplicated acute myocarditis, preceded by URI symptoms.  No pericarditis.  Supportive management with additional outpatient imaging.    PLAN:  Okay to discharge from cardiac perspective.  If he gets recurrent chest pain or palpitations, seek medical help ASAP.  Outpatient CT coronary angiogram and cardiac MRI with clinic follow-up ordered.  Stay well-hydrated, plenty of rest, avoid returning to the gym or strenuous exercise until completion of testing and clinic follow-up.  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  I personally updated patient's RN with the plan.  Cardiology will sign off. Thank you for consulting us.      Sindy George MD, MD [facility]  Cardiology.        This note was completed in part using dictation via the Dragon voice recognition software. Some word and grammatical errors may occur and must be interpreted in the appropriate clinical context. If there are any questions pertaining to this issue, please contact me for further clarification.       VITAL SIGNS:  Temp: 98.8  F (37.1  C) Temp src: Oral BP: 124/62 Pulse: 74   Resp: 18 SpO2: 97 % O2 Device: None (Room air)    12/10 0700 - 12/15 0659  In: 246 [P.O.:240; I.V.:6]  Out: -   Net: 246  Vitals:    12/13/24 1506 12/13/24 1935 12/14/24 0633 12/15/24 0716   Weight: 110 kg (242 lb 8.1 oz) 109.4 kg (241 lb 3.2 oz) 109.4 kg (241 lb 3.2 oz) 106 kg (233 lb  9.6 oz)

## 2024-12-16 ENCOUNTER — TELEPHONE (OUTPATIENT)
Dept: CARDIOLOGY | Facility: CLINIC | Age: 24
End: 2024-12-16

## 2024-12-16 ENCOUNTER — PATIENT OUTREACH (OUTPATIENT)
Dept: CARE COORDINATION | Facility: CLINIC | Age: 24
End: 2024-12-16

## 2024-12-16 NOTE — TELEPHONE ENCOUNTER
Patient was admitted to [facility] on 12/13/24 with acute myocarditis preceded by URI symptoms. Elevated troponin.    PMH: none    12/13/24: Echo showed EF of  55%, mild basal inferolateral hypokinesis, no pericardial effusion. No evidence of pericarditis.    No chest pain since admission without analgesics. Troponin down trending.    Per Dr. George's plan of care:    If he gets recurrent chest pain or palpitations, seek medical help ASAP.  Outpatient CT coronary angiogram and cardiac MRI with clinic follow-up ordered.  Stay well-hydrated, plenty of rest, avoid returning to the gym or strenuous exercise until completion of testing and clinic follow-up.  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    No new medications started.    Pt needs to be scheduled for a cMRI, CTA and cardiology OV as ordered.    Writer attempted to call pt for a cardiology post discharge phone call, but no answer. VM left to call back with any non emergent cardiac related questions. Reminder left that the he needs to schedule appt's as above. Scheduling and Dr. George's Team RN phone numbers provided. RUBIN Mckeon RN.

## 2024-12-16 NOTE — PROGRESS NOTES
Clinic Care Coordination Contact  Transitions of Care Outreach  No chief complaint on file.      Most Recent Admission Date: 12/13/2024   Most Recent Admission Diagnosis: Chest pain - R07.9  Myocarditis (H) - I51.4  Elevated troponin - R79.89     Most Recent Discharge Date: 12/15/2024   Most Recent Discharge Diagnosis: Chest pain - R07.9  Elevated troponin - R79.89  Myocarditis (H) - I51.4  Acute idiopathic myocarditis - I40.1  Precordial chest pain - R07.2     Transitions of Care Assessment    Discharge Assessment  How are you doing now that you are home?: Patient shares that he is doing well. No quesitons/concerns or needs. Writer provided number to Cardiology to schedule for testing. Thanks writer for the call  How are your symptoms? (Red Flag symptoms escalate to triage hotline per guidelines): Improved  Do you know how to contact your clinic care team if you have future questions or changes to your health status? : Yes  Does the patient have their discharge instructions? : Yes  Does the patient have questions regarding their discharge instructions? : No  Were you started on any new medications or were there changes to any of your previous medications? : Yes  Does the patient have all of their medications?: Yes  Do you have questions regarding any of your medications? : No  Do you have all of your needed medical supplies or equipment (DME)?  (i.e. oxygen tank, CPAP, cane, etc.): Yes    Post-op ([facility] CTA Only)  If the patient had a surgery or procedure, do they have any questions for a nurse?: No    Post-op (Clinicians Only)  Did the patient have surgery or a procedure: Yes        Follow up Plan     Discharge Follow-Up  Discharge follow up appointment scheduled in alignment with recommended follow up timeframe or Transitions of Risk Category? (Low = within 30 days; Moderate= within 14 days; High= within 7 days): No  Patient's follow up appointment not scheduled: Patient declined scheduling support. Education on  the importance of transitions of care follow up. Provided scheduling phone number. (Patient will call to schedule)    No future appointments.    Outpatient Plan as outlined on AVS reviewed with patient.    For any urgent concerns, please contact our 24 hour nurse triage line: [phone] ([phone])       NORBERT Bedolla

## 2024-12-18 LAB
ATRIAL RATE - MUSE: 83 BPM
BACTERIA BLD CULT: NO GROWTH
BACTERIA BLD CULT: NO GROWTH
DIASTOLIC BLOOD PRESSURE - MUSE: NORMAL MMHG
INTERPRETATION ECG - MUSE: NORMAL
P AXIS - MUSE: 30 DEGREES
PR INTERVAL - MUSE: 132 MS
QRS DURATION - MUSE: 84 MS
QT - MUSE: 374 MS
QTC - MUSE: 439 MS
R AXIS - MUSE: 36 DEGREES
SYSTOLIC BLOOD PRESSURE - MUSE: NORMAL MMHG
T AXIS - MUSE: 31 DEGREES
VENTRICULAR RATE- MUSE: 83 BPM

## 2025-01-29 ENCOUNTER — APPOINTMENT (OUTPATIENT)
Age: 25
Setting detail: DERMATOLOGY
End: 2025-01-29

## 2025-01-29 DIAGNOSIS — L63.8 OTHER ALOPECIA AREATA: ICD-10-CM

## 2025-01-29 PROCEDURE — ? COUNSELING

## 2025-01-29 PROCEDURE — 11900 INJECT SKIN LESIONS </W 7: CPT

## 2025-01-29 PROCEDURE — ? INTRALESIONAL KENALOG

## 2025-01-29 ASSESSMENT — LOCATION SIMPLE DESCRIPTION DERM
LOCATION SIMPLE: LEFT CHEEK
LOCATION SIMPLE: SCALP
LOCATION SIMPLE: LEFT LIP
LOCATION SIMPLE: RIGHT CHEEK

## 2025-01-29 ASSESSMENT — LOCATION ZONE DERM
LOCATION ZONE: SCALP
LOCATION ZONE: FACE
LOCATION ZONE: LIP

## 2025-01-29 ASSESSMENT — LOCATION DETAILED DESCRIPTION DERM
LOCATION DETAILED: RIGHT SUPERIOR CENTRAL BUCCAL CHEEK
LOCATION DETAILED: LEFT CENTRAL FRONTAL SCALP
LOCATION DETAILED: LEFT LOWER CUTANEOUS LIP
LOCATION DETAILED: LEFT INFERIOR CENTRAL MALAR CHEEK
LOCATION DETAILED: RIGHT CENTRAL FRONTAL SCALP

## 2025-01-29 NOTE — PROCEDURE: INTRALESIONAL KENALOG
Kenalog Type Of Vial: Multiple Dose
How Many Mls Were Removed From The 10 Mg/Ml (5ml) Vial When Preparing The Injectable Solution?: 0
Expiration Date For Kenalog (Optional): APR 2027
Include Z78.9 (Other Specified Conditions Influencing Health Status) As An Associated Diagnosis?: No
Consent: The risks of atrophy were reviewed with the patient.
Detail Level: Detailed
Show Inventory Tab: Hide
Concentration Of Kenalog Solution Injected (Mg/Ml): 10.0
Validate Note Data When Using Inventory: Yes
Lot # For Kenalog (Optional): [id number]
Medical Necessity Clause: This procedure was medically necessary because the lesions that were treated were:
Kenalog Preparation: Kenalog
Total Volume (Ccs): 1.0
Concentration Of Kenalog Solution Injected (Mg/Ml): 2.5
Total Volume (Ccs): 1

## 2025-01-29 NOTE — HPI: HAIR LOSS (ALOPECIA AREATA)
How Severe Is It?: mild
Is This A New Presentation, Or A Follow-Up?: Follow Up Alopecia Areata
Additional History: Patient was injected with .8 cc of kenalog 10 mg/ mL to the scalp and 1.0 cc of kenalog 2.5 to the beard and eyebrow. He is also treating with rogaine 5% foam.

## 2025-02-10 ENCOUNTER — APPOINTMENT (OUTPATIENT)
Dept: GENERAL RADIOLOGY | Facility: CLINIC | Age: 25
End: 2025-02-10
Attending: EMERGENCY MEDICINE

## 2025-02-10 ENCOUNTER — HOSPITAL ENCOUNTER (EMERGENCY)
Facility: CLINIC | Age: 25
Discharge: HOME | End: 2025-02-10
Attending: EMERGENCY MEDICINE | Admitting: EMERGENCY MEDICINE

## 2025-02-10 VITALS
OXYGEN SATURATION: 97 % | WEIGHT: 235 LBS | DIASTOLIC BLOOD PRESSURE: 77 MMHG | HEIGHT: 72 IN | SYSTOLIC BLOOD PRESSURE: 136 MMHG | RESPIRATION RATE: 18 BRPM | BODY MASS INDEX: 31.83 KG/M2 | TEMPERATURE: 98.2 F | HEART RATE: 71 BPM

## 2025-02-10 DIAGNOSIS — R07.9 NONSPECIFIC CHEST PAIN: ICD-10-CM

## 2025-02-10 LAB
ALBUMIN SERPL BCG-MCNC: 4.9 G/DL (ref 3.5–5.2)
ALP SERPL-CCNC: 86 U/L (ref 40–150)
ALT SERPL W P-5'-P-CCNC: 53 U/L (ref 0–70)
ANION GAP SERPL CALCULATED.3IONS-SCNC: 11 MMOL/L (ref 7–15)
AST SERPL W P-5'-P-CCNC: 29 U/L (ref 0–45)
BASOPHILS # BLD AUTO: 0 10E3/UL (ref 0–0.2)
BASOPHILS NFR BLD AUTO: 0 %
BILIRUB SERPL-MCNC: 0.5 MG/DL
BUN SERPL-MCNC: 14.9 MG/DL (ref 6–20)
CALCIUM SERPL-MCNC: 10 MG/DL (ref 8.8–10.4)
CHLORIDE SERPL-SCNC: 102 MMOL/L (ref 98–107)
CREAT SERPL-MCNC: 0.92 MG/DL (ref 0.67–1.17)
EGFRCR SERPLBLD CKD-EPI 2021: >90 ML/MIN/1.73M2
EOSINOPHIL # BLD AUTO: 0.2 10E3/UL (ref 0–0.7)
EOSINOPHIL NFR BLD AUTO: 2 %
ERYTHROCYTE [DISTWIDTH] IN BLOOD BY AUTOMATED COUNT: 12.6 % (ref 10–15)
GLUCOSE SERPL-MCNC: 85 MG/DL (ref 70–99)
HCO3 SERPL-SCNC: 28 MMOL/L (ref 22–29)
HCT VFR BLD AUTO: 46.6 % (ref 40–53)
HGB BLD-MCNC: 16.1 G/DL (ref 13.3–17.7)
HOLD SPECIMEN: NORMAL
HOLD SPECIMEN: NORMAL
IMM GRANULOCYTES # BLD: 0 10E3/UL
IMM GRANULOCYTES NFR BLD: 0 %
LYMPHOCYTES # BLD AUTO: 2 10E3/UL (ref 0.8–5.3)
LYMPHOCYTES NFR BLD AUTO: 26 %
MCH RBC QN AUTO: 28.9 PG (ref 26.5–33)
MCHC RBC AUTO-ENTMCNC: 34.5 G/DL (ref 31.5–36.5)
MCV RBC AUTO: 84 FL (ref 78–100)
MONOCYTES # BLD AUTO: 0.8 10E3/UL (ref 0–1.3)
MONOCYTES NFR BLD AUTO: 10 %
NEUTROPHILS # BLD AUTO: 4.6 10E3/UL (ref 1.6–8.3)
NEUTROPHILS NFR BLD AUTO: 61 %
NRBC # BLD AUTO: 0 10E3/UL
NRBC BLD AUTO-RTO: 0 /100
PLATELET # BLD AUTO: 301 10E3/UL (ref 150–450)
POTASSIUM SERPL-SCNC: 4.1 MMOL/L (ref 3.4–5.3)
PROT SERPL-MCNC: 7.6 G/DL (ref 6.4–8.3)
RBC # BLD AUTO: 5.58 10E6/UL (ref 4.4–5.9)
SODIUM SERPL-SCNC: 141 MMOL/L (ref 135–145)
TROPONIN T SERPL HS-MCNC: 7 NG/L
TROPONIN T SERPL HS-MCNC: 7 NG/L
WBC # BLD AUTO: 7.6 10E3/UL (ref 4–11)

## 2025-02-10 PROCEDURE — 99285 EMERGENCY DEPT VISIT HI MDM: CPT | Mod: 25

## 2025-02-10 PROCEDURE — 93005 ELECTROCARDIOGRAM TRACING: CPT

## 2025-02-10 PROCEDURE — 71046 X-RAY EXAM CHEST 2 VIEWS: CPT

## 2025-02-10 PROCEDURE — 80048 BASIC METABOLIC PNL TOTAL CA: CPT | Performed by: EMERGENCY MEDICINE

## 2025-02-10 PROCEDURE — 85004 AUTOMATED DIFF WBC COUNT: CPT | Performed by: EMERGENCY MEDICINE

## 2025-02-10 PROCEDURE — 85041 AUTOMATED RBC COUNT: CPT | Performed by: EMERGENCY MEDICINE

## 2025-02-10 PROCEDURE — 84520 ASSAY OF UREA NITROGEN: CPT | Performed by: EMERGENCY MEDICINE

## 2025-02-10 PROCEDURE — 36415 COLL VENOUS BLD VENIPUNCTURE: CPT | Performed by: EMERGENCY MEDICINE

## 2025-02-10 PROCEDURE — 84484 ASSAY OF TROPONIN QUANT: CPT | Performed by: EMERGENCY MEDICINE

## 2025-02-10 ASSESSMENT — ACTIVITIES OF DAILY LIVING (ADL)
ADLS_ACUITY_SCORE: 46

## 2025-02-10 NOTE — Clinical Note
Michael Roman was seen and treated in our emergency department on 2/10/2025.  He may return to work on 02/12/2025.       If you have any questions or concerns, please don't hesitate to call.      Naveed Edwards MD

## 2025-02-10 NOTE — ED PROVIDER NOTES
Emergency Department Note      History of Present Illness     Chief Complaint   Chest Pain      HPI   Michael Roman is a 24 year old male with a history of acute idiopathic myocarditis presenting to the ED for evaluation of chest pain. The patient reports that he began experiencing chest pain two nights ago that is not exacerbated with respiration or positional changes. About two months ago, he had an upper respiratory infection, and began experiencing a similar chest pain. When he came to the ED at that time, his troponin was elevated, so he was admitted to the hospital for suspected viral myocarditis.     Independent Historian   None    Review of External Notes   Reviewed hospitalization records from December 2024.    Past Medical History     Medical History and Problem List   Acute idiopathic myocarditis    Medications   The patient is currently on no regular medications.     Surgical History   ORIF right ankle     Physical Exam     Patient Vitals for the past 24 hrs:   BP Temp Temp src Pulse Resp SpO2 Height Weight   02/10/25 1334 136/77 98.2  F (36.8  C) Oral 71 18 97 % 1.829 m (6') 106.6 kg (235 lb)     Physical Exam  Nursing note and vitals reviewed.  HENT:   Mouth/Throat: Moist mucous membranes.   Eyes: EOMI, nonicteric sclera  Cardiovascular: Normal rate, regular rhythm, no murmurs, rubs, or gallops  Pulmonary/Chest: Effort normal and breath sounds normal. No respiratory distress. No wheezes. No rales.   Abdominal: Soft. Nontender, nondistended, no guarding or rigidity.   Musculoskeletal: Normal range of motion.   Neurological: Alert. Moves all extremities spontaneously.   Skin: Skin is warm and dry. No rash noted.         Diagnostics     Lab Results   Labs Ordered and Resulted from Time of ED Arrival to Time of ED Departure   COMPREHENSIVE METABOLIC PANEL - Normal       Result Value    Sodium 141      Potassium 4.1      Carbon Dioxide (CO2) 28      Anion Gap 11      Urea Nitrogen 14.9       Creatinine 0.92      GFR Estimate >90      Calcium 10.0      Chloride 102      Glucose 85      Alkaline Phosphatase 86      AST 29      ALT 53      Protein Total 7.6      Albumin 4.9      Bilirubin Total 0.5     TROPONIN T, HIGH SENSITIVITY - Normal    Troponin T, High Sensitivity 7     CBC WITH PLATELETS AND DIFFERENTIAL    WBC Count 7.6      RBC Count 5.58      Hemoglobin 16.1      Hematocrit 46.6      MCV 84      MCH 28.9      MCHC 34.5      RDW 12.6      Platelet Count 301      % Neutrophils 61      % Lymphocytes 26      % Monocytes 10      % Eosinophils 2      % Basophils 0      % Immature Granulocytes 0      NRBCs per 100 WBC 0      Absolute Neutrophils 4.6      Absolute Lymphocytes 2.0      Absolute Monocytes 0.8      Absolute Eosinophils 0.2      Absolute Basophils 0.0      Absolute Immature Granulocytes 0.0      Absolute NRBCs 0.0       Imaging   Chest XR,  PA & LAT   Final Result   IMPRESSION: Negative chest.        EKG   ECG taken at 1325, ECG read at 1514  Normal sinus rhythm  Nonspecific T wave abnormality    No significant change as compared to prior, dated 12/14/24.  Rate 68 bpm. MO interval 126 ms. QRS duration 92 ms. QT/QTc 392/416 ms. P-R-T axes -8 36 43.    Independent Interpretation   I independently reviewed the patient's chest XR. I see no evidence of pneumothorax/infiltrate.     ED Course      Medications Administered   Medications - No data to display    Procedures   Procedures     Discussion of Management   None    Additional Documentation  None    Medical Decision Making / Diagnosis     CMS Diagnoses: None    MIPS       None    [facility]   Michael Lidia Roman is a 24 year old male who presents with chief complaint chest pain in the context of recent diagnosis of myocarditis 2 months ago.  Patient states that he had insurance problems and did not complete recommended outpatient testing from that hospitalization.  He states he was advised to avoid alcohol and exercise for some time and very  recently restarted both.  He now presents with chest pain.  Differential would include repeat myocarditis, pericarditis, pericardial effusion, PE, pleurisy, pneumonia, pleural effusion, ACS, musculoskeletal strain, referred GI pain, among many other etiologies.  Exam is reassuring.  Chest x-ray is without evidence of mediastinal widening or enlarged cardiac silhouette.  He is PERC negative therefore no further PE workup indicated.  Troponin x 2 also normal ruling out repeat episode of myocarditis.  At this time, there does not appear to be any evidence of emergent pathology and he is safe for discharge and outpatient monitoring.  I did recommend that he complete the testing as was previously recommended by cardiology.  Orders are still in place. He is in stable condition at the time of discharge, red flags that should merit ED return were discussed as well as recommended follow-up instructions. All questions were answered and he is in agreement with the plan.      Disposition   The patient was discharged.     Diagnosis     ICD-10-CM    1. Nonspecific chest pain  R07.9            Discharge Medications   New Prescriptions    No medications on file     Scribe Disclosure:  I, Homa Shore, am serving as a scribe at 3:21 PM on 2/10/2025 to document services personally performed by Naveed Edwards MD based on my observations and the provider's statements to me.        Naveed Edwards MD  02/11/25 0900

## 2025-02-10 NOTE — ED TRIAGE NOTES
Pt c/o of chest pain since Saturday. Pt was hospitalized in December for elevated troponin. Pt states he started feeling same chest pain

## 2025-02-11 LAB
ATRIAL RATE - MUSE: 68 BPM
DIASTOLIC BLOOD PRESSURE - MUSE: NORMAL MMHG
INTERPRETATION ECG - MUSE: NORMAL
P AXIS - MUSE: -8 DEGREES
PR INTERVAL - MUSE: 126 MS
QRS DURATION - MUSE: 92 MS
QT - MUSE: 392 MS
QTC - MUSE: 416 MS
R AXIS - MUSE: 36 DEGREES
SYSTOLIC BLOOD PRESSURE - MUSE: NORMAL MMHG
T AXIS - MUSE: 43 DEGREES
VENTRICULAR RATE- MUSE: 68 BPM

## 2025-04-09 ENCOUNTER — APPOINTMENT (OUTPATIENT)
Age: 25
Setting detail: DERMATOLOGY
End: 2025-04-09

## 2025-04-09 DIAGNOSIS — L63.8 OTHER ALOPECIA AREATA: ICD-10-CM

## 2025-04-09 PROCEDURE — 11900 INJECT SKIN LESIONS </W 7: CPT

## 2025-04-09 PROCEDURE — ? COUNSELING

## 2025-04-09 PROCEDURE — ? INTRALESIONAL KENALOG

## 2025-04-09 ASSESSMENT — LOCATION DETAILED DESCRIPTION DERM
LOCATION DETAILED: RIGHT CENTRAL FRONTAL SCALP
LOCATION DETAILED: RIGHT INFERIOR MEDIAL BUCCAL CHEEK
LOCATION DETAILED: LEFT CENTRAL FRONTAL SCALP
LOCATION DETAILED: LEFT INFERIOR MEDIAL BUCCAL CHEEK

## 2025-04-09 ASSESSMENT — LOCATION SIMPLE DESCRIPTION DERM
LOCATION SIMPLE: RIGHT CHEEK
LOCATION SIMPLE: SCALP
LOCATION SIMPLE: LEFT CHEEK

## 2025-04-09 ASSESSMENT — LOCATION ZONE DERM
LOCATION ZONE: FACE
LOCATION ZONE: SCALP

## 2025-04-09 NOTE — HPI: HAIR LOSS (ALOPECIA AREATA)
How Severe Is It?: mild
Is This A New Presentation, Or A Follow-Up?: Follow Up Alopecia Areata
Additional History: Patient was injected with 1 cc of kenalog 10 mg/ mL to the scalp and 1.0 cc of kenalog 2.5 to the beard and eyebrow. He is also treating with rogaine 5% foam.

## 2025-04-09 NOTE — PROCEDURE: INTRALESIONAL KENALOG
Kenalog Type Of Vial: Multiple Dose
How Many Mls Were Removed From The 10 Mg/Ml (5ml) Vial When Preparing The Injectable Solution?: 0
Expiration Date For Kenalog (Optional): May 2026
Include Z78.9 (Other Specified Conditions Influencing Health Status) As An Associated Diagnosis?: No
Consent: The risks of atrophy were reviewed with the patient.
Detail Level: Detailed
Show Inventory Tab: Hide
Concentration Of Kenalog Solution Injected (Mg/Ml): 10.0
Validate Note Data When Using Inventory: Yes
Lot # For Kenalog (Optional): [id number]
Medical Necessity Clause: This procedure was medically necessary because the lesions that were treated were:
Kenalog Preparation: Kenalog
Total Volume (Ccs): 1.0
Lot # For Kenalog (Optional): [id number]
Concentration Of Kenalog Solution Injected (Mg/Ml): 2.5
Expiration Date For Kenalog (Optional): APR 2027
Total Volume (Ccs): 1

## 2025-06-10 ENCOUNTER — APPOINTMENT (OUTPATIENT)
Age: 25
Setting detail: DERMATOLOGY
End: 2025-06-10

## 2025-06-10 DIAGNOSIS — L63.8 OTHER ALOPECIA AREATA: ICD-10-CM

## 2025-06-10 PROCEDURE — ? INTRALESIONAL KENALOG

## 2025-06-10 PROCEDURE — ? ADDITIONAL NOTES

## 2025-06-10 PROCEDURE — ? PRESCRIPTION MEDICATION MANAGEMENT

## 2025-06-10 PROCEDURE — ? PRESCRIPTION

## 2025-06-10 PROCEDURE — ? COUNSELING

## 2025-06-10 RX ORDER — MINOXIDIL 2.5 MG/1
TABLET ORAL
Qty: 45 | Refills: 1 | Status: ERX | COMMUNITY
Start: 2025-06-10

## 2025-06-10 RX ADMIN — MINOXIDIL: 2.5 TABLET ORAL at 00:00

## 2025-06-10 ASSESSMENT — LOCATION ZONE DERM
LOCATION ZONE: FACE
LOCATION ZONE: SCALP

## 2025-06-10 ASSESSMENT — LOCATION DETAILED DESCRIPTION DERM
LOCATION DETAILED: RIGHT INFERIOR MEDIAL BUCCAL CHEEK
LOCATION DETAILED: LEFT INFERIOR MEDIAL BUCCAL CHEEK
LOCATION DETAILED: LEFT CENTRAL FRONTAL SCALP
LOCATION DETAILED: RIGHT CENTRAL FRONTAL SCALP

## 2025-06-10 ASSESSMENT — LOCATION SIMPLE DESCRIPTION DERM
LOCATION SIMPLE: RIGHT CHEEK
LOCATION SIMPLE: SCALP
LOCATION SIMPLE: LEFT CHEEK

## 2025-06-10 NOTE — PROCEDURE: PRESCRIPTION MEDICATION MANAGEMENT
Detail Level: Zone
Render In Strict Bullet Format?: No
Initiate Treatment: Oral minoxidil 2.5 mg. Take a half tablet once daily

## 2025-06-10 NOTE — PROCEDURE: INTRALESIONAL KENALOG
Kenalog Type Of Vial: Multiple Dose
How Many Mls Were Removed From The 10 Mg/Ml (5ml) Vial When Preparing The Injectable Solution?: 0
Expiration Date For Kenalog (Optional): SEP 2027
Include Z78.9 (Other Specified Conditions Influencing Health Status) As An Associated Diagnosis?: No
Consent: The risks of atrophy were reviewed with the patient.
Detail Level: Detailed
Show Inventory Tab: Hide
Concentration Of Kenalog Solution Injected (Mg/Ml): 10.0
Validate Note Data When Using Inventory: Yes
Lot # For Kenalog (Optional): [id number]
Medical Necessity Clause: This procedure was medically necessary because the lesions that were treated were:
Kenalog Preparation: Kenalog
Total Volume (Ccs): 1.0
Concentration Of Kenalog Solution Injected (Mg/Ml): 2.5
Total Volume (Ccs): 0.4

## 2025-06-10 NOTE — PROCEDURE: ADDITIONAL NOTES
Render Risk Assessment In Note?: no
Detail Level: Simple
Additional Notes: Discussed topical versus oral minoxidil. Patient is in agreement with starting both. Monitor blood pressure while taking oral minoxidil.

## 2025-07-19 ENCOUNTER — HEALTH MAINTENANCE LETTER (OUTPATIENT)
Age: 25
End: 2025-07-19

## 2025-08-12 ENCOUNTER — APPOINTMENT (OUTPATIENT)
Age: 25
Setting detail: DERMATOLOGY
End: 2025-08-12

## 2025-08-12 DIAGNOSIS — L63.8 OTHER ALOPECIA AREATA: ICD-10-CM

## 2025-08-12 PROCEDURE — ? INTRALESIONAL KENALOG

## 2025-08-12 ASSESSMENT — LOCATION ZONE DERM
LOCATION ZONE: FACE
LOCATION ZONE: SCALP

## 2025-08-12 ASSESSMENT — LOCATION DETAILED DESCRIPTION DERM
LOCATION DETAILED: RIGHT INFERIOR MEDIAL BUCCAL CHEEK
LOCATION DETAILED: LEFT CENTRAL PARIETAL SCALP
LOCATION DETAILED: LEFT INFERIOR MEDIAL BUCCAL CHEEK
LOCATION DETAILED: LEFT INFERIOR PARIETAL SCALP
LOCATION DETAILED: RIGHT INFERIOR PARIETAL SCALP
LOCATION DETAILED: RIGHT CENTRAL FRONTAL SCALP

## 2025-08-12 ASSESSMENT — LOCATION SIMPLE DESCRIPTION DERM
LOCATION SIMPLE: LEFT CHEEK
LOCATION SIMPLE: RIGHT CHEEK
LOCATION SIMPLE: SCALP